# Patient Record
Sex: FEMALE | Race: WHITE | NOT HISPANIC OR LATINO | Employment: UNEMPLOYED | URBAN - METROPOLITAN AREA
[De-identification: names, ages, dates, MRNs, and addresses within clinical notes are randomized per-mention and may not be internally consistent; named-entity substitution may affect disease eponyms.]

---

## 2023-03-15 ENCOUNTER — APPOINTMENT (OUTPATIENT)
Dept: CT IMAGING | Facility: HOSPITAL | Age: 53
End: 2023-03-15
Payer: COMMERCIAL

## 2023-03-15 ENCOUNTER — HOSPITAL ENCOUNTER (EMERGENCY)
Facility: HOSPITAL | Age: 53
Discharge: HOME | End: 2023-03-15
Attending: EMERGENCY MEDICINE | Admitting: EMERGENCY MEDICINE
Payer: COMMERCIAL

## 2023-03-15 VITALS
DIASTOLIC BLOOD PRESSURE: 86 MMHG | TEMPERATURE: 96.2 F | RESPIRATION RATE: 18 BRPM | BODY MASS INDEX: 38.64 KG/M2 | HEIGHT: 62 IN | WEIGHT: 210 LBS | SYSTOLIC BLOOD PRESSURE: 157 MMHG | HEART RATE: 70 BPM | OXYGEN SATURATION: 97 %

## 2023-03-15 DIAGNOSIS — K57.92 ACUTE DIVERTICULITIS: Primary | ICD-10-CM

## 2023-03-15 DIAGNOSIS — R10.12 LEFT UPPER QUADRANT ABDOMINAL PAIN: ICD-10-CM

## 2023-03-15 DIAGNOSIS — M54.6 ACUTE LEFT-SIDED THORACIC BACK PAIN: ICD-10-CM

## 2023-03-15 LAB
ALBUMIN SERPL-MCNC: 4.3 G/DL (ref 3.5–5.2)
ALBUMIN/GLOB SERPL: 1.3 G/DL
ALP SERPL-CCNC: 82 U/L (ref 39–117)
ALT SERPL W P-5'-P-CCNC: 22 U/L (ref 1–33)
ANION GAP SERPL CALCULATED.3IONS-SCNC: 11.7 MMOL/L (ref 5–15)
AST SERPL-CCNC: 23 U/L (ref 1–32)
BASOPHILS # BLD AUTO: 0.04 10*3/MM3 (ref 0–0.2)
BASOPHILS NFR BLD AUTO: 0.6 % (ref 0–1.5)
BILIRUB SERPL-MCNC: 0.3 MG/DL (ref 0–1.2)
BILIRUB UR QL STRIP: NEGATIVE
BUN SERPL-MCNC: 8 MG/DL (ref 6–20)
BUN/CREAT SERPL: 10.4 (ref 7–25)
CALCIUM SPEC-SCNC: 9 MG/DL (ref 8.6–10.5)
CHLORIDE SERPL-SCNC: 104 MMOL/L (ref 98–107)
CLARITY UR: CLEAR
CO2 SERPL-SCNC: 27.3 MMOL/L (ref 22–29)
COLOR UR: YELLOW
CREAT SERPL-MCNC: 0.77 MG/DL (ref 0.57–1)
DEPRECATED RDW RBC AUTO: 40.6 FL (ref 37–54)
EGFRCR SERPLBLD CKD-EPI 2021: 94.1 ML/MIN/1.73
EOSINOPHIL # BLD AUTO: 0.07 10*3/MM3 (ref 0–0.4)
EOSINOPHIL NFR BLD AUTO: 1 % (ref 0.3–6.2)
ERYTHROCYTE [DISTWIDTH] IN BLOOD BY AUTOMATED COUNT: 12.4 % (ref 12.3–15.4)
GLOBULIN UR ELPH-MCNC: 3.3 GM/DL
GLUCOSE SERPL-MCNC: 99 MG/DL (ref 65–99)
GLUCOSE UR STRIP-MCNC: NEGATIVE MG/DL
HCT VFR BLD AUTO: 48.3 % (ref 34–46.6)
HGB BLD-MCNC: 15.6 G/DL (ref 12–15.9)
HGB UR QL STRIP.AUTO: NEGATIVE
IMM GRANULOCYTES # BLD AUTO: 0.02 10*3/MM3 (ref 0–0.05)
IMM GRANULOCYTES NFR BLD AUTO: 0.3 % (ref 0–0.5)
KETONES UR QL STRIP: NEGATIVE
LEUKOCYTE ESTERASE UR QL STRIP.AUTO: NEGATIVE
LIPASE SERPL-CCNC: 41 U/L (ref 13–60)
LYMPHOCYTES # BLD AUTO: 1.17 10*3/MM3 (ref 0.7–3.1)
LYMPHOCYTES NFR BLD AUTO: 17.4 % (ref 19.6–45.3)
MCH RBC QN AUTO: 28.5 PG (ref 26.6–33)
MCHC RBC AUTO-ENTMCNC: 32.3 G/DL (ref 31.5–35.7)
MCV RBC AUTO: 88.1 FL (ref 79–97)
MONOCYTES # BLD AUTO: 0.46 10*3/MM3 (ref 0.1–0.9)
MONOCYTES NFR BLD AUTO: 6.8 % (ref 5–12)
NEUTROPHILS NFR BLD AUTO: 4.96 10*3/MM3 (ref 1.7–7)
NEUTROPHILS NFR BLD AUTO: 73.9 % (ref 42.7–76)
NITRITE UR QL STRIP: NEGATIVE
NRBC BLD AUTO-RTO: 0 /100 WBC (ref 0–0.2)
PH UR STRIP.AUTO: 5.5 [PH] (ref 5–8)
PLATELET # BLD AUTO: 290 10*3/MM3 (ref 140–450)
PMV BLD AUTO: 9.4 FL (ref 6–12)
POTASSIUM SERPL-SCNC: 4.4 MMOL/L (ref 3.5–5.2)
PROT SERPL-MCNC: 7.6 G/DL (ref 6–8.5)
PROT UR QL STRIP: NEGATIVE
RBC # BLD AUTO: 5.48 10*6/MM3 (ref 3.77–5.28)
SODIUM SERPL-SCNC: 143 MMOL/L (ref 136–145)
SP GR UR STRIP: 1.01 (ref 1–1.03)
UROBILINOGEN UR QL STRIP: NORMAL
WBC NRBC COR # BLD: 6.72 10*3/MM3 (ref 3.4–10.8)

## 2023-03-15 PROCEDURE — 80053 COMPREHEN METABOLIC PANEL: CPT | Performed by: EMERGENCY MEDICINE

## 2023-03-15 PROCEDURE — 25010000002 KETOROLAC TROMETHAMINE PER 15 MG: Performed by: EMERGENCY MEDICINE

## 2023-03-15 PROCEDURE — 74176 CT ABD & PELVIS W/O CONTRAST: CPT

## 2023-03-15 PROCEDURE — 85025 COMPLETE CBC W/AUTO DIFF WBC: CPT | Performed by: EMERGENCY MEDICINE

## 2023-03-15 PROCEDURE — 25010000002 ONDANSETRON PER 1 MG: Performed by: EMERGENCY MEDICINE

## 2023-03-15 PROCEDURE — 83690 ASSAY OF LIPASE: CPT | Performed by: EMERGENCY MEDICINE

## 2023-03-15 PROCEDURE — 81003 URINALYSIS AUTO W/O SCOPE: CPT | Performed by: EMERGENCY MEDICINE

## 2023-03-15 PROCEDURE — 99284 EMERGENCY DEPT VISIT MOD MDM: CPT

## 2023-03-15 PROCEDURE — 96375 TX/PRO/DX INJ NEW DRUG ADDON: CPT

## 2023-03-15 PROCEDURE — 96374 THER/PROPH/DIAG INJ IV PUSH: CPT

## 2023-03-15 RX ORDER — ONDANSETRON 2 MG/ML
4 INJECTION INTRAMUSCULAR; INTRAVENOUS ONCE
Status: COMPLETED | OUTPATIENT
Start: 2023-03-15 | End: 2023-03-15

## 2023-03-15 RX ORDER — KETOROLAC TROMETHAMINE 30 MG/ML
INJECTION, SOLUTION INTRAMUSCULAR; INTRAVENOUS
Status: DISCONTINUED
Start: 2023-03-15 | End: 2023-03-15

## 2023-03-15 RX ORDER — ONDANSETRON 2 MG/ML
INJECTION INTRAMUSCULAR; INTRAVENOUS
Status: DISCONTINUED
Start: 2023-03-15 | End: 2023-03-15

## 2023-03-15 RX ORDER — HYDROCODONE BITARTRATE AND ACETAMINOPHEN 7.5; 325 MG/1; MG/1
1 TABLET ORAL ONCE
Status: COMPLETED | OUTPATIENT
Start: 2023-03-15 | End: 2023-03-15

## 2023-03-15 RX ORDER — AMOXICILLIN AND CLAVULANATE POTASSIUM 875; 125 MG/1; MG/1
1 TABLET, FILM COATED ORAL 2 TIMES DAILY
Qty: 20 TABLET | Refills: 0 | Status: SHIPPED | OUTPATIENT
Start: 2023-03-15 | End: 2023-03-25

## 2023-03-15 RX ORDER — KETOROLAC TROMETHAMINE 30 MG/ML
30 INJECTION, SOLUTION INTRAMUSCULAR; INTRAVENOUS ONCE
Status: COMPLETED | OUTPATIENT
Start: 2023-03-15 | End: 2023-03-15

## 2023-03-15 RX ORDER — SODIUM CHLORIDE 0.9 % (FLUSH) 0.9 %
10 SYRINGE (ML) INJECTION AS NEEDED
Status: DISCONTINUED | OUTPATIENT
Start: 2023-03-15 | End: 2023-03-15 | Stop reason: HOSPADM

## 2023-03-15 RX ORDER — ACETAMINOPHEN 500 MG
1000 TABLET ORAL EVERY 8 HOURS PRN
Qty: 60 TABLET | Refills: 0 | Status: SHIPPED | OUTPATIENT
Start: 2023-03-15

## 2023-03-15 RX ORDER — HYDROCODONE BITARTRATE AND ACETAMINOPHEN 7.5; 325 MG/1; MG/1
TABLET ORAL
Status: DISCONTINUED
Start: 2023-03-15 | End: 2023-03-15

## 2023-03-15 RX ADMIN — HYDROCODONE BITARTRATE AND ACETAMINOPHEN 1 TABLET: 7.5; 325 TABLET ORAL at 11:24

## 2023-03-15 RX ADMIN — ONDANSETRON 4 MG: 2 INJECTION INTRAMUSCULAR; INTRAVENOUS at 09:36

## 2023-03-15 RX ADMIN — KETOROLAC TROMETHAMINE 30 MG: 30 INJECTION, SOLUTION INTRAMUSCULAR at 09:36

## 2023-03-15 NOTE — ED PROVIDER NOTES
EMERGENCY DEPARTMENT ENCOUNTER    Room Number:  33/33  Date seen:  3/15/2023  PCP: Provider, No Known  Historian: Patient      HPI:  Chief Complaint: Back pain, abdominal pain  A complete HPI/ROS/PMH/PSH/SH/FH are unobtainable due to: Nothing  Context: Moon Gonzalez is a 52 y.o. female who presents to the ED c/o mid back pain that radiates around to the left side of her abdomen.  She reports this pain has been present for about 3 days, getting worse.  She denies dysuria.  She denies any injury or trauma.  She has had no rash other than a chronic rash on her lower abdomen where she had her  several years ago.  Patient denies fever.  She denies chest pain or shortness of breath.            PAST MEDICAL HISTORY  Active Ambulatory Problems     Diagnosis Date Noted   • No Active Ambulatory Problems     Resolved Ambulatory Problems     Diagnosis Date Noted   • No Resolved Ambulatory Problems     Past Medical History:   Diagnosis Date   • Asthma    • COPD (chronic obstructive pulmonary disease) ([facility]ly [facility])    • Depression    • Hypertension    • RA (rheumatoid arthritis) (Formerly [facility])    • Renal disorder          PAST SURGICAL HISTORY  Past Surgical History:   Procedure Laterality Date   • BLADDER SURGERY     • CARDIAC ABLATION     •  SECTION     • CHOLECYSTECTOMY           FAMILY HISTORY  History reviewed. No pertinent family history.      SOCIAL HISTORY  Social History     Socioeconomic History   • Marital status: Single   Tobacco Use   • Smoking status: Former     Types: Cigarettes     Quit date: 2020     Years since quitting: 3.2   • Smokeless tobacco: Never   Vaping Use   • Vaping Use: Never used   Substance and Sexual Activity   • Alcohol use: Not Currently   • Drug use: Never   • Sexual activity: Defer         ALLERGIES  Ambien [zolpidem], Bactrim [sulfamethoxazole-trimethoprim], and Zoloft [sertraline hcl]        REVIEW OF SYSTEMS  Review of Systems   Review of all 14 systems is negative other than stated in the  HPI above.      PHYSICAL EXAM  ED Triage Vitals   Temp Heart Rate Resp BP SpO2   03/15/23 0823 03/15/23 0823 03/15/23 0845 03/15/23 0845 03/15/23 0823   96.2 °F (35.7 °C) 79 20 134/86 97 %      Temp src Heart Rate Source Patient Position BP Location FiO2 (%)   03/15/23 0823 03/15/23 0823 -- -- --   Tympanic Monitor          Physical Exam      GENERAL: Awake and alert, no acute distress  HENT: nares patent  EYES: no scleral icterus, EOMI  CV: regular rhythm, normal rate  RESPIRATORY: normal effort  ABDOMEN: soft, nondistended, nontender throughout  MUSCULOSKELETAL: no deformity, mild lower T-spine tenderness without step-off, no L-spine tenderness.  Mild point tenderness over the left thoracic musculature.  NEURO: alert, moves all extremities, follows commands, cranial nerves II through XII grossly intact  PSYCH:  calm, cooperative  SKIN: warm, dry, normal to inspection, no rash over the left upper back or left flank.  There is mild erythema in the pannicular fold.    Vital signs and nursing notes reviewed.          LAB RESULTS  Recent Results (from the past 24 hour(s))   Comprehensive Metabolic Panel    Collection Time: 03/15/23  8:52 AM    Specimen: Blood   Result Value Ref Range    Glucose 99 65 - 99 mg/dL    BUN 8 6 - 20 mg/dL    Creatinine 0.77 0.57 - 1.00 mg/dL    Sodium 143 136 - 145 mmol/L    Potassium 4.4 3.5 - 5.2 mmol/L    Chloride 104 98 - 107 mmol/L    CO2 27.3 22.0 - 29.0 mmol/L    Calcium 9.0 8.6 - 10.5 mg/dL    Total Protein 7.6 6.0 - 8.5 g/dL    Albumin 4.3 3.5 - 5.2 g/dL    ALT (SGPT) 22 1 - 33 U/L    AST (SGOT) 23 1 - 32 U/L    Alkaline Phosphatase 82 39 - 117 U/L    Total Bilirubin 0.3 0.0 - 1.2 mg/dL    Globulin 3.3 gm/dL    A/G Ratio 1.3 g/dL    BUN/Creatinine Ratio 10.4 7.0 - 25.0    Anion Gap 11.7 5.0 - 15.0 mmol/L    eGFR 94.1 >60.0 mL/min/1.73   Lipase    Collection Time: 03/15/23  8:52 AM    Specimen: Blood   Result Value Ref Range    Lipase 41 13 - 60 U/L   CBC Auto Differential     Collection Time: 03/15/23  8:52 AM    Specimen: Blood   Result Value Ref Range    WBC 6.72 3.40 - 10.80 10*3/mm3    RBC 5.48 (H) 3.77 - 5.28 10*6/mm3    Hemoglobin 15.6 12.0 - 15.9 g/dL    Hematocrit 48.3 (H) 34.0 - 46.6 %    MCV 88.1 79.0 - 97.0 fL    MCH 28.5 26.6 - 33.0 pg    MCHC 32.3 31.5 - 35.7 g/dL    RDW 12.4 12.3 - 15.4 %    RDW-SD 40.6 37.0 - 54.0 fl    MPV 9.4 6.0 - 12.0 fL    Platelets 290 140 - 450 10*3/mm3    Neutrophil % 73.9 42.7 - 76.0 %    Lymphocyte % 17.4 (L) 19.6 - 45.3 %    Monocyte % 6.8 5.0 - 12.0 %    Eosinophil % 1.0 0.3 - 6.2 %    Basophil % 0.6 0.0 - 1.5 %    Immature Grans % 0.3 0.0 - 0.5 %    Neutrophils, Absolute 4.96 1.70 - 7.00 10*3/mm3    Lymphocytes, Absolute 1.17 0.70 - 3.10 10*3/mm3    Monocytes, Absolute 0.46 0.10 - 0.90 10*3/mm3    Eosinophils, Absolute 0.07 0.00 - 0.40 10*3/mm3    Basophils, Absolute 0.04 0.00 - 0.20 10*3/mm3    Immature Grans, Absolute 0.02 0.00 - 0.05 10*3/mm3    nRBC 0.0 0.0 - 0.2 /100 WBC   Urinalysis With Microscopic If Indicated (No Culture) - Urine, Clean Catch    Collection Time: 03/15/23  8:53 AM    Specimen: Urine, Clean Catch   Result Value Ref Range    Color, UA Yellow Yellow, Straw    Appearance, UA Clear Clear    pH, UA 5.5 5.0 - 8.0    Specific Gravity, UA 1.007 1.005 - 1.030    Glucose, UA Negative Negative    Ketones, UA Negative Negative    Bilirubin, UA Negative Negative    Blood, UA Negative Negative    Protein, UA Negative Negative    Leuk Esterase, UA Negative Negative    Nitrite, UA Negative Negative    Urobilinogen, UA 0.2 E.U./dL 0.2 - 1.0 E.U./dL       Ordered the above labs and reviewed the results.        RADIOLOGY  CT Abdomen Pelvis Without Contrast    Result Date: 3/15/2023  CT ABDOMEN AND PELVIS WITHOUT IV CONTRAST  HISTORY: Midthoracic back pain, left flank/upper quadrant pain.  TECHNIQUE: Radiation dose reduction techniques were utilized, including automated exposure control and exposure modulation based on body size. 3 mm  images were obtained through the abdomen and pelvis without the administration of IV contrast. Lack of IV contrast limits evaluation of solid, visceral, and vascular structures. Sensitivity for underlying lesions and infection decreased.  COMPARISON: None  FINDINGS:  LOWER CHEST: Streak artifact from overlying leads/soft tissues. Within normal limits.  ABDOMEN: Liver/Biliary Tract: Heterogeneous areas of low-attenuation can be seen with fatty infiltration. Cholecystectomy.  Spleen: Within normal limits.  Pancreas: Within normal limits.  Adrenals: Within normal limits.  Kidneys:  No hydronephrosis or radiopaque obstructing stones.  Bowel:  No obstruction. Normal appendix. Scattered colonic diverticulosis with very minimal stranding subjacent to the mid descending colon and mildly prominent subjacent vasculature. Ensure up-to-date with colonoscopy after treatment.  Peritoneum: Within normal limits.  Vasculature:    Scattered calcific atherosclerosis.  Lymph Nodes:  Scattered subcentimeter nodes not enlarged by CT criteria.                             PELVIS:                                 Pelvic organs: The bladder is incompletely distended. Fluid and/or thickening in the endocervical canal. Phleboliths/vascular calcifications.  Abdominal/Pelvic Wall: Scarring ventral pelvis. Mild subcutaneous soft tissue edema..  BONES: Multilevel degenerative changes most severe at L5-S1 at least moderate canal stenosis and neuroforaminal narrowing.      1. Colonic diverticulosis with subtle stranding about the proximal descending which can be seen with developing or evolving uncomplicated diverticulitis in the appropriate setting. 2. Degenerative changes most severe at L5-S1. Given back pain, MRI could be considered for further evaluation. 3. Fluid and/or thickening about the endocervical canal please correlate with phase of menstrual cycle and ensure up-to-date with pelvic exam. 4. Please see above for additional  findings/recommendations.  This report was finalized on 3/15/2023 9:35 AM by Dr. Kevin Traore M.D.        Ordered the above noted radiological studies. Reviewed by me in PACS.            PROCEDURES  Procedures              MEDICATIONS GIVEN IN ER  Medications   ketorolac (TORADOL) injection 30 mg (30 mg Intravenous Given 3/15/23 0936)   ondansetron (ZOFRAN) injection 4 mg (4 mg Intravenous Given 3/15/23 0936)   HYDROcodone-acetaminophen (NORCO) 7.5-325 MG per tablet 1 tablet (1 tablet Oral Given 3/15/23 1124)                   MEDICAL DECISION MAKING, PROGRESS, and CONSULTS    All labs have been independently reviewed by me.  All radiology studies have been reviewed by me and I have also reviewed the radiology report.   EKG's independently viewed and interpreted by me.  Discussion below represents my analysis of pertinent findings related to patient's condition, differential diagnosis, treatment plan and final disposition.      Additional sources:  - Discussed/ obtained information from independent historians:  n/a      - Chronic or social conditions impacting care: patient currently uninsured        Orders placed during this visit:  Orders Placed This Encounter   Procedures   • CT Abdomen Pelvis Without Contrast   • Comprehensive Metabolic Panel   • Lipase   • Urinalysis With Microscopic If Indicated (No Culture) - Urine, Clean Catch   • CBC Auto Differential   • Ambulatory Referral to Family Practice   • CBC & Differential                 Differential diagnosis:    Thoracic muscle strain  Pancreatitis  Aortic pathology  Colitis  Ureteral calculus      Independent interpretation of labs, radiology studies, and discussions with consultants:  ED Course as of 03/15/23 2114   Wed Mar 15, 2023   0931 Blood, UA: Negative [JR]   0932 WBC: 6.72 [JR]   0932 CT abdomen independently interpreted in PACS.  There is no ureteral calculus or evidence of perinephric fat stranding. [JR]   1034 Patient informed of CT findings  concerning for diverticulitis.  On repeat examination she does have some point tenderness in the left upper quadrant of the abdomen correlating with the findings on CT.  I discussed plan to treat her with empiric oral antibiotics for uncomplicated diverticulitis and I discussed return precautions.  She will be referred to PCP [JR]      ED Course User Index  [JR] Blair Tinoco MD                 DIAGNOSIS  Final diagnoses:   Acute diverticulitis   Acute left-sided thoracic back pain   Left upper quadrant abdominal pain         DISPOSITION  DISCHARGE    Patient discharged in stable condition.    Reviewed implications of results, diagnosis, meds, responsibility to follow up, warning signs and symptoms of possible worsening, potential complications and reasons to return to ER.    Patient/Family voiced understanding of above instructions.    Discussed plan for discharge, as there is no emergent indication for admission. Patient referred to primary care provider for BP management due to today's BP. Pt/family is agreeable and understands need for follow up and repeat testing.  Pt is aware that discharge does not mean that nothing is wrong but it indicates no emergency is present that requires admission and they must continue care with follow-up as given below or physician of their choice.     FOLLOW-UP  PATIENT CONNECTION - [place] [id number]  [phone]  Schedule an appointment as soon as possible for a visit            Medication List      New Prescriptions    Acetaminophen Extra Strength 500 MG tablet  Generic drug: acetaminophen  Take 2 tablets by mouth Every 8 (Eight) Hours As Needed for Mild Pain.     amoxicillin-clavulanate 875-125 MG per tablet  Commonly known as: AUGMENTIN  Take 1 tablet by mouth 2 (Two) Times a Day for 10 days.           Where to Get Your Medications      These medications were sent to [facility] - [place] [address]     Hours: 7:00 AM-6:00 PM Mon-Fri, 8:00 AM-4:30 PM Sat-Sun (Closed 12-12:30PM) Phone: [phone]   · Acetaminophen Extra Strength 500 MG tablet  · amoxicillin-clavulanate 875-125 MG per tablet                   Latest Documented Vital Signs:  As of 21:14 EDT  BP- 157/86 HR- 70 Temp- 96.2 °F (35.7 °C) (Tympanic) O2 sat- 97%              --    Please note that portions of this were completed with a voice recognition program.       Note Disclaimer: At [facility], we believe that sharing information builds trust and better relationships. You are receiving this note because you are receiving care at [facility] or recently visited. It is possible you will see health information before a provider has talked with you about it. This kind of information can be easy to misunderstand. To help you fully understand what it means for your health, we urge you to discuss this note with your provider.           Blair Tinoco MD  03/15/23 9581

## 2023-03-15 NOTE — ED TRIAGE NOTES
"Pt to ED from home. Pt complains of mid lower back pain x3 days. Pt states pain wraps around to llq. Pt states pain is constant but increases with movement. Pt ambulatory. Pt reports she has been seen previously for this pain but was told \"nothing was wrong.\"   "

## 2024-10-22 ENCOUNTER — TRANSCRIBE ORDERS (OUTPATIENT)
Facility: HOSPITAL | Age: 54
End: 2024-10-22

## 2024-10-22 DIAGNOSIS — Z12.31 ENCOUNTER FOR SCREENING MAMMOGRAM FOR MALIGNANT NEOPLASM OF BREAST: Primary | ICD-10-CM

## 2024-10-31 ENCOUNTER — HOSPITAL ENCOUNTER (OUTPATIENT)
Facility: HOSPITAL | Age: 54
Discharge: HOME | End: 2024-11-03
Payer: COMMERCIAL

## 2024-10-31 VITALS — HEIGHT: 62 IN | WEIGHT: 196 LBS | BODY MASS INDEX: 36.07 KG/M2

## 2024-10-31 DIAGNOSIS — Z12.31 ENCOUNTER FOR SCREENING MAMMOGRAM FOR MALIGNANT NEOPLASM OF BREAST: ICD-10-CM

## 2024-10-31 PROCEDURE — 77067 SCR MAMMO BI INCL CAD: CPT

## 2024-10-31 PROCEDURE — 77063 BREAST TOMOSYNTHESIS BI: CPT

## 2024-11-12 ENCOUNTER — HOSPITAL ENCOUNTER (EMERGENCY)
Facility: HOSPITAL | Age: 54
Discharge: HOME | End: 2024-11-12
Payer: COMMERCIAL

## 2024-11-12 VITALS
TEMPERATURE: 98.1 F | HEIGHT: 62 IN | OXYGEN SATURATION: 97 % | SYSTOLIC BLOOD PRESSURE: 102 MMHG | RESPIRATION RATE: 18 BRPM | WEIGHT: 205 LBS | HEART RATE: 67 BPM | BODY MASS INDEX: 37.73 KG/M2 | DIASTOLIC BLOOD PRESSURE: 71 MMHG

## 2024-11-12 DIAGNOSIS — Z20.2 EXPOSURE TO STD: ICD-10-CM

## 2024-11-12 DIAGNOSIS — A59.00 GU INFECTION, TRICHOMONAL: Primary | ICD-10-CM

## 2024-11-12 DIAGNOSIS — B96.89 BACTERIAL VAGINOSIS: ICD-10-CM

## 2024-11-12 DIAGNOSIS — N76.0 BACTERIAL VAGINOSIS: ICD-10-CM

## 2024-11-12 LAB
APPEARANCE UR: ABNORMAL
BACTERIA URNS QL MICRO: NEGATIVE /HPF
BILIRUB UR QL: NEGATIVE
CLUE CELLS VAG QL WET PREP: ABNORMAL
COLOR UR: ABNORMAL
EPITH CASTS URNS QL MICRO: ABNORMAL /LPF
GLUCOSE UR STRIP.AUTO-MCNC: NEGATIVE MG/DL
HGB UR QL STRIP: ABNORMAL
KETONES UR QL STRIP.AUTO: NEGATIVE MG/DL
LEUKOCYTE ESTERASE UR QL STRIP.AUTO: ABNORMAL
NITRITE UR QL STRIP.AUTO: NEGATIVE
PH UR STRIP: 6 (ref 5–8)
PROT UR STRIP-MCNC: NEGATIVE MG/DL
RBC #/AREA URNS HPF: ABNORMAL /HPF (ref 0–5)
SP GR UR REFRACTOMETRY: 1.01 (ref 1–1.03)
T VAGINALIS VAG QL WET PREP: ABNORMAL
TRICHOMONAS UR QL MICRO: PRESENT
URINE CULTURE IF INDICATED: ABNORMAL
UROBILINOGEN UR QL STRIP.AUTO: 0.1 EU/DL (ref 0.2–1)
WBC URNS QL MICRO: ABNORMAL /HPF (ref 0–4)
YEAST: ABNORMAL

## 2024-11-12 PROCEDURE — 87591 N.GONORRHOEAE DNA AMP PROB: CPT

## 2024-11-12 PROCEDURE — 87086 URINE CULTURE/COLONY COUNT: CPT

## 2024-11-12 PROCEDURE — 96372 THER/PROPH/DIAG INJ SC/IM: CPT

## 2024-11-12 PROCEDURE — 2500000003 HC RX 250 WO HCPCS: Performed by: PHYSICIAN ASSISTANT

## 2024-11-12 PROCEDURE — 6360000002 HC RX W HCPCS: Performed by: PHYSICIAN ASSISTANT

## 2024-11-12 PROCEDURE — 87491 CHLMYD TRACH DNA AMP PROBE: CPT

## 2024-11-12 PROCEDURE — 6370000000 HC RX 637 (ALT 250 FOR IP): Performed by: PHYSICIAN ASSISTANT

## 2024-11-12 PROCEDURE — 87210 SMEAR WET MOUNT SALINE/INK: CPT

## 2024-11-12 PROCEDURE — 99284 EMERGENCY DEPT VISIT MOD MDM: CPT

## 2024-11-12 PROCEDURE — 81001 URINALYSIS AUTO W/SCOPE: CPT

## 2024-11-12 RX ORDER — METRONIDAZOLE 500 MG/1
500 TABLET ORAL 3 TIMES DAILY
Qty: 30 TABLET | Refills: 0 | Status: SHIPPED | OUTPATIENT
Start: 2024-11-12 | End: 2024-11-22

## 2024-11-12 RX ORDER — ONDANSETRON 4 MG/1
4 TABLET, ORALLY DISINTEGRATING ORAL EVERY 8 HOURS PRN
Status: DISCONTINUED | OUTPATIENT
Start: 2024-11-12 | End: 2024-11-12 | Stop reason: HOSPADM

## 2024-11-12 RX ORDER — AZITHROMYCIN 500 MG/1
1000 TABLET, FILM COATED ORAL
Status: COMPLETED | OUTPATIENT
Start: 2024-11-12 | End: 2024-11-12

## 2024-11-12 RX ADMIN — ONDANSETRON 4 MG: 4 TABLET, ORALLY DISINTEGRATING ORAL at 15:17

## 2024-11-12 RX ADMIN — AZITHROMYCIN DIHYDRATE 1000 MG: 500 TABLET ORAL at 15:17

## 2024-11-12 RX ADMIN — LIDOCAINE HYDROCHLORIDE 500 MG: 10 INJECTION, SOLUTION EPIDURAL; INFILTRATION; INTRACAUDAL; PERINEURAL at 15:18

## 2024-11-12 ASSESSMENT — LIFESTYLE VARIABLES
HOW OFTEN DO YOU HAVE A DRINK CONTAINING ALCOHOL: NEVER
HOW MANY STANDARD DRINKS CONTAINING ALCOHOL DO YOU HAVE ON A TYPICAL DAY: PATIENT DOES NOT DRINK

## 2024-11-12 ASSESSMENT — PAIN SCALES - GENERAL: PAINLEVEL_OUTOF10: 6

## 2024-11-12 ASSESSMENT — PAIN DESCRIPTION - LOCATION: LOCATION: ABDOMEN

## 2024-11-12 ASSESSMENT — PAIN - FUNCTIONAL ASSESSMENT: PAIN_FUNCTIONAL_ASSESSMENT: 0-10

## 2024-11-12 NOTE — ED TRIAGE NOTES
Pt ambulatory to triage reporting 6/10 lower abdominal pain, swelling and pain to genital area, and gray/green vaginal discharge. Tried treatment with monistat and vagisil with no improvement. Unsure if STD or yeast infection.

## 2024-11-12 NOTE — ED PROVIDER NOTES
Reflex to Culture    Collection Time: 11/12/24  2:30 PM    Specimen: Urine   Result Value Ref Range    Color, UA Yellow/Straw      Appearance Hazy (A) Clear      Specific Gravity, UA 1.015 1.003 - 1.030      pH, Urine 6.0 5.0 - 8.0      Protein, UA Negative Negative mg/dL    Glucose, Ur Negative Negative mg/dL    Ketones, Urine Negative Negative mg/dL    Bilirubin, Urine Negative Negative      Blood, Urine Small (A) Negative      Urobilinogen, Urine 0.1 (L) 0.2 - 1.0 EU/dL    Nitrite, Urine Negative Negative      Leukocyte Esterase, Urine Large (A) Negative      WBC, UA 10-20 0 - 4 /hpf    RBC, UA 0-5 0 - 5 /hpf    Epithelial Cells, UA Few Few /lpf    BACTERIA, URINE Negative Negative /hpf    Urine Culture if Indicated Urine Culture Ordered (A) Culture not indicated by UA result      Trichomonas, Urine Present     Wet prep, genital    Collection Time: 11/12/24  2:30 PM    Specimen: Miscellaneous sample   Result Value Ref Range    Clue Cells, Wet Prep Clue Cells present (A) NONE SEEN      Trich, Wet Prep Trichomonas present (A) NONE SEEN      Yeast, UA NONE SEEN NONE SEEN         EKG: Not Applicable    Radiologic Studies:  Non-plain film images such as CT, Ultrasound and MRI are read by the radiologist. Plain radiographic images are visualized and preliminarily interpreted by the ED Physician with the following findings: Not Applicable.    Interpretation per the Radiologist below, if available at the time of this note:  No orders to display        ED COURSE and DIFFERENTIAL DIAGNOSIS/MDM   3:10 PM Differential and Considerations: 54-year-old female presents to the ED with vaginal irritation lower abdominal pain.  I have ordered swabs for GC chlamydia, wet prep and will check a urine.  I have deferred her  exam, until I get the results of her swabs back.  Should the swabs be inconclusive then I will conduct a GYN exam.    Records Reviewed (source and summary of external notes): Prior medical records and Nursing

## 2024-11-12 NOTE — DISCHARGE INSTRUCTIONS
Thank you for choosing our Emergency Department for your care.  It is our privilege to care for you in your time of need.  In the next several days, you may receive a survey via email or mailed to your home about your experience with our team.  We would greatly appreciate you taking a few minutes to complete the survey, as we use this information to learn what we have done well and what we could be doing better. Thank you for trusting us with your care!    Below you will find a list of your tests from today's visit.   Labs  Recent Results (from the past 12 hour(s))   Urinalysis with Reflex to Culture    Collection Time: 11/12/24  2:30 PM    Specimen: Urine   Result Value Ref Range    Color, UA Yellow/Straw      Appearance Hazy (A) Clear      Specific Gravity, UA 1.015 1.003 - 1.030      pH, Urine 6.0 5.0 - 8.0      Protein, UA Negative Negative mg/dL    Glucose, Ur Negative Negative mg/dL    Ketones, Urine Negative Negative mg/dL    Bilirubin, Urine Negative Negative      Blood, Urine Small (A) Negative      Urobilinogen, Urine 0.1 (L) 0.2 - 1.0 EU/dL    Nitrite, Urine Negative Negative      Leukocyte Esterase, Urine Large (A) Negative      WBC, UA 10-20 0 - 4 /hpf    RBC, UA 0-5 0 - 5 /hpf    Epithelial Cells, UA Few Few /lpf    BACTERIA, URINE Negative Negative /hpf    Urine Culture if Indicated Urine Culture Ordered (A) Culture not indicated by UA result      Trichomonas, Urine Present     Wet prep, genital    Collection Time: 11/12/24  2:30 PM    Specimen: Miscellaneous sample   Result Value Ref Range    Clue Cells, Wet Prep Clue Cells present (A) NONE SEEN      Trich, Wet Prep Trichomonas present (A) NONE SEEN      Yeast, UA NONE SEEN NONE SEEN         Radiologic Studies  No orders to display     ------------------------------------------------------------------------------------------------------------  The evaluation and treatment you received in the Emergency Department were for an urgent problem. It is

## 2024-11-13 LAB
BACTERIA SPEC CULT: NORMAL
C TRACH DNA SPEC QL NAA+PROBE: NEGATIVE
COLONY COUNT, CNT: NORMAL
COLONY COUNT, CNT: NORMAL
Lab: NORMAL
N GONORRHOEA DNA SPEC QL NAA+PROBE: NEGATIVE
SAMPLE TYPE: NORMAL
SERVICE CMNT-IMP: NORMAL
SPECIMEN SOURCE: NORMAL

## 2024-11-14 ENCOUNTER — TELEPHONE (OUTPATIENT)
Age: 54
End: 2024-11-14

## 2024-11-14 NOTE — TELEPHONE ENCOUNTER
11/14/2024 @7:31am-Called [phone] and L/M to have patient R/T phone call due to message she left to R/S appt 11/14/2024 due to transportation.ww

## 2025-05-05 ENCOUNTER — HOSPITAL ENCOUNTER (INPATIENT)
Facility: HOSPITAL | Age: 55
LOS: 4 days | Discharge: OTHER INSTITUTION | DRG: 750 | End: 2025-05-09
Attending: STUDENT IN AN ORGANIZED HEALTH CARE EDUCATION/TRAINING PROGRAM | Admitting: PSYCHIATRY & NEUROLOGY
Payer: COMMERCIAL

## 2025-05-05 DIAGNOSIS — R45.851 SUICIDAL IDEATION: ICD-10-CM

## 2025-05-05 PROBLEM — F20.9 SCHIZOPHRENIA (HCC): Status: ACTIVE | Noted: 2025-05-05

## 2025-05-05 LAB
ALBUMIN SERPL-MCNC: 2.7 G/DL (ref 3.5–5)
ALBUMIN/GLOB SERPL: 0.9 (ref 1.1–2.2)
ALP SERPL-CCNC: 69 U/L (ref 45–117)
ALT SERPL-CCNC: 23 U/L (ref 12–78)
AMPHET UR QL SCN: POSITIVE
ANION GAP SERPL CALC-SCNC: 2 MMOL/L (ref 2–12)
APPEARANCE UR: CLEAR
AST SERPL W P-5'-P-CCNC: 21 U/L (ref 15–37)
BACTERIA URNS QL MICRO: NEGATIVE /HPF
BARBITURATES UR QL SCN: NEGATIVE
BASOPHILS # BLD: 0.05 K/UL (ref 0–0.1)
BASOPHILS NFR BLD: 0.8 % (ref 0–1)
BENZODIAZ UR QL: NEGATIVE
BILIRUB SERPL-MCNC: 0.2 MG/DL (ref 0.2–1)
BILIRUB UR QL: NEGATIVE
BUN SERPL-MCNC: 6 MG/DL (ref 6–20)
BUN/CREAT SERPL: 7 (ref 12–20)
CA-I BLD-MCNC: 8.6 MG/DL (ref 8.5–10.1)
CANNABINOIDS UR QL SCN: NEGATIVE
CHLORIDE SERPL-SCNC: 113 MMOL/L (ref 97–108)
CO2 SERPL-SCNC: 29 MMOL/L (ref 21–32)
COCAINE UR QL SCN: POSITIVE
COLOR UR: ABNORMAL
CREAT SERPL-MCNC: 0.85 MG/DL (ref 0.55–1.02)
DIFFERENTIAL METHOD BLD: NORMAL
EOSINOPHIL # BLD: 0.15 K/UL (ref 0–0.4)
EOSINOPHIL NFR BLD: 2.5 % (ref 0–7)
EPITH CASTS URNS QL MICRO: ABNORMAL /LPF
ERYTHROCYTE [DISTWIDTH] IN BLOOD BY AUTOMATED COUNT: 14.3 % (ref 11.5–14.5)
ETHANOL SERPL-MCNC: <10 MG/DL (ref 0–0.08)
FLUAV RNA SPEC QL NAA+PROBE: NOT DETECTED
FLUBV RNA SPEC QL NAA+PROBE: NOT DETECTED
GLOBULIN SER CALC-MCNC: 3 G/DL (ref 2–4)
GLUCOSE SERPL-MCNC: 96 MG/DL (ref 65–100)
GLUCOSE UR STRIP.AUTO-MCNC: 150 MG/DL
HCT VFR BLD AUTO: 43.2 % (ref 35–47)
HGB BLD-MCNC: 13.9 G/DL (ref 11.5–16)
HGB UR QL STRIP: NEGATIVE
IMM GRANULOCYTES # BLD AUTO: 0.03 K/UL (ref 0–0.04)
IMM GRANULOCYTES NFR BLD AUTO: 0.5 % (ref 0–0.5)
KETONES UR QL STRIP.AUTO: NEGATIVE MG/DL
LEUKOCYTE ESTERASE UR QL STRIP.AUTO: NEGATIVE
LYMPHOCYTES # BLD: 1.44 K/UL (ref 0.8–3.5)
LYMPHOCYTES NFR BLD: 23.7 % (ref 12–49)
Lab: ABNORMAL
MCH RBC QN AUTO: 28.7 PG (ref 26–34)
MCHC RBC AUTO-ENTMCNC: 32.2 G/DL (ref 30–36.5)
MCV RBC AUTO: 89.3 FL (ref 80–99)
METHADONE UR QL: NEGATIVE
MONOCYTES # BLD: 0.35 K/UL (ref 0–1)
MONOCYTES NFR BLD: 5.8 % (ref 5–13)
MUCOUS THREADS URNS QL MICRO: ABNORMAL /LPF
NEUTS SEG # BLD: 4.05 K/UL (ref 1.8–8)
NEUTS SEG NFR BLD: 66.7 % (ref 32–75)
NITRITE UR QL STRIP.AUTO: NEGATIVE
NRBC # BLD: 0 K/UL (ref 0–0.01)
NRBC BLD-RTO: 0 PER 100 WBC
OPIATES UR QL: NEGATIVE
PCP UR QL: NEGATIVE
PH UR STRIP: 5 (ref 5–8)
PLATELET # BLD AUTO: 277 K/UL (ref 150–400)
PMV BLD AUTO: 9.9 FL (ref 8.9–12.9)
POTASSIUM SERPL-SCNC: 4.4 MMOL/L (ref 3.5–5.1)
PROT SERPL-MCNC: 5.7 G/DL (ref 6.4–8.2)
PROT UR STRIP-MCNC: NEGATIVE MG/DL
RBC # BLD AUTO: 4.84 M/UL (ref 3.8–5.2)
RBC #/AREA URNS HPF: ABNORMAL /HPF (ref 0–5)
SARS-COV-2 RNA RESP QL NAA+PROBE: NOT DETECTED
SODIUM SERPL-SCNC: 144 MMOL/L (ref 136–145)
SP GR UR REFRACTOMETRY: 1.01 (ref 1–1.03)
URINE CULTURE IF INDICATED: ABNORMAL
UROBILINOGEN UR QL STRIP.AUTO: 0.1 EU/DL (ref 0.1–1)
WBC # BLD AUTO: 6.1 K/UL (ref 3.6–11)
WBC URNS QL MICRO: ABNORMAL /HPF (ref 0–4)

## 2025-05-05 PROCEDURE — 99285 EMERGENCY DEPT VISIT HI MDM: CPT

## 2025-05-05 PROCEDURE — 80307 DRUG TEST PRSMV CHEM ANLYZR: CPT

## 2025-05-05 PROCEDURE — 1240000000 HC EMOTIONAL WELLNESS R&B

## 2025-05-05 PROCEDURE — 80053 COMPREHEN METABOLIC PANEL: CPT

## 2025-05-05 PROCEDURE — 87636 SARSCOV2 & INF A&B AMP PRB: CPT

## 2025-05-05 PROCEDURE — 82077 ASSAY SPEC XCP UR&BREATH IA: CPT

## 2025-05-05 PROCEDURE — 81001 URINALYSIS AUTO W/SCOPE: CPT

## 2025-05-05 PROCEDURE — 93005 ELECTROCARDIOGRAM TRACING: CPT | Performed by: EMERGENCY MEDICINE

## 2025-05-05 PROCEDURE — 6370000000 HC RX 637 (ALT 250 FOR IP): Performed by: EMERGENCY MEDICINE

## 2025-05-05 PROCEDURE — 90791 PSYCH DIAGNOSTIC EVALUATION: CPT

## 2025-05-05 PROCEDURE — 85025 COMPLETE CBC W/AUTO DIFF WBC: CPT

## 2025-05-05 RX ORDER — LORAZEPAM 1 MG/1
1 TABLET ORAL ONCE
Status: COMPLETED | OUTPATIENT
Start: 2025-05-05 | End: 2025-05-05

## 2025-05-05 RX ORDER — TRAZODONE HYDROCHLORIDE 50 MG/1
50 TABLET ORAL NIGHTLY PRN
Status: DISCONTINUED | OUTPATIENT
Start: 2025-05-05 | End: 2025-05-07

## 2025-05-05 RX ORDER — MAGNESIUM HYDROXIDE/ALUMINUM HYDROXICE/SIMETHICONE 120; 1200; 1200 MG/30ML; MG/30ML; MG/30ML
30 SUSPENSION ORAL EVERY 6 HOURS PRN
Status: DISCONTINUED | OUTPATIENT
Start: 2025-05-05 | End: 2025-05-09 | Stop reason: HOSPADM

## 2025-05-05 RX ORDER — ACETAMINOPHEN 325 MG/1
650 TABLET ORAL EVERY 4 HOURS PRN
Status: DISCONTINUED | OUTPATIENT
Start: 2025-05-05 | End: 2025-05-09 | Stop reason: HOSPADM

## 2025-05-05 RX ADMIN — LORAZEPAM 1 MG: 1 TABLET ORAL at 21:54

## 2025-05-05 ASSESSMENT — PAIN SCALES - GENERAL
PAINLEVEL_OUTOF10: 8

## 2025-05-05 ASSESSMENT — PAIN - FUNCTIONAL ASSESSMENT: PAIN_FUNCTIONAL_ASSESSMENT: 0-10

## 2025-05-05 ASSESSMENT — PAIN DESCRIPTION - LOCATION: LOCATION: GENERALIZED

## 2025-05-05 NOTE — VIRTUAL HEALTH
Mariana Benavides  [id number]  1970     Social Work Behavioral Health Crisis Assessment    05/05/25    Chief Complaint: Suicidal ideation    HPI: Patient is a 54 y.o. White (non-) female who presents for suicidal ideation. Patient presented to the ED on 05/05/25 from [facility]. She is alert, oriented, cooperative, attentive, and tearful. Three days ago, she overdosed on cocaine with the intention of ending her life. She has a history of suicide attempts, to include: intentional overdoses, hanging, and cutting her wrist. She has a psychiatric history of: Schizophrenia, Paranoia, Command Auditory Hallucinations, Anxiety, Depression, Cocaine Abuse, Insomnia and PTSD. She's had multiple inpatient psychiatric admissions in the past. [facility] and participates in outpatient psychiatric care with SAYRA Malik APRN. She takes her psychiatric medication as prescribed but feels the dosages my be too strong. She has a poor support system. Her only identified reason for living is God.    Collateral: Unable to obtain collateral    Past Psychiatric History:  Previous Diagnoses/symptoms: Schizophrenia, Paranoia, Command Auditory Hallucinations, Anxiety, Depression, Cocaine Abuse, Insomnia, PTSD  Previous suicide attempts/self-harm: intentionally overdosed 3 nights , intentional overdose multiple times in the past, hanging, slit wrist  Inpatient psychiatric hospitalizations: yes  Current outpatient psychiatric provider: SAYRA Malik APRN  Current therapist: Tameka Vang  Previous psychiatric medication trials: No prior medication trials  Current psychiatric medications: Clonazapam, Topamax   Family Psychiatric History: anxiety, depression    Sleep Hours: up to 16 hours per day  Sleep concerns:  sleeps too much  Use of sleep medications:  Seroquel    Substance Abuse History:  Tobacco: Endorses daily  Alcohol: Denies  Marijuana: Denies  Stimulant:

## 2025-05-05 NOTE — ED NOTES
Pt changed into green gown, belongings placed in (2) plastic pt belongings bags that are labeled; pt also has (1) tote bag with her with multiple pt labels present on the inside/outside. 3 bags placed in nourishment room at station 3.     Pt updated on plan of care; is calm and cooperative.

## 2025-05-05 NOTE — ED TRIAGE NOTES
Patient states a few days ago used crack cocaine in an attempt to overdose. She is endorsing SI thoughts. She is in recovery program.   Last use of drugs Friday.     Paranoid schiz and on meds, + complaint.     She is with an outreach program who brought her here.     [facility].     No hi, endorses hearing voices to harm herself.     Patient is tearful, states she has lost all her family both kids and this time of year is tough.     Placed in triage chair 1 with 1:1 constant observer after triage as no rooms are available.

## 2025-05-05 NOTE — ED PROVIDER NOTES
EMERGENCY DEPARTMENT HISTORY AND PHYSICAL EXAM      Patient Name: Mariana Benavides  MRN: [id number]  YOB: 1970  Date of evaluation: 5/5/2025  Provider: Robi Diggs MD     History of Present Illness     Chief Complaint   Patient presents with    Mental Health Problem    Suicidal       History Provided By: Patient    HPI: Mariana Benavides, 54 y.o. female with past medical history as listed and reviewed below presenting to the ED for suicidal ideation, she states she tried to overdose on cocaine a few days ago, currently in drug rehab program.  Sent here from her reach program.  She states history of paranoid schizophrenia with positive already hallucinations encouraging suicide.  She denies any other symptoms.    Medical History     Past Medical History:  No past medical history on file.    Past Surgical History:  No past surgical history on file.    Family History:  Family History   Problem Relation Age of Onset    Breast Cancer Maternal Grandmother        Social History:  Social History     Tobacco Use    Smoking status: Every Day     Types: Cigarettes    Smokeless tobacco: Never   Vaping Use    Vaping status: Never Used   Substance Use Topics    Alcohol use: Not Currently    Drug use: Yes     Types: Cocaine       Allergies:  Allergies   Allergen Reactions    Ambien [Zolpidem Tartrate]     Zoloft [Sertraline Hcl] Hallucinations    Bactrim [Sulfamethoxazole-Trimethoprim] Rash       PCP: Ruddy Mitchell MD    Current Medications:   Current Facility-Administered Medications   Medication Dose Route Frequency Provider Last Rate Last Admin    acetaminophen (TYLENOL) tablet 650 mg  650 mg Oral Q4H PRN Ray Posadas MD        traZODone (DESYREL) tablet 50 mg  50 mg Oral Nightly PRN Ray Posadas MD        magnesium hydroxide (MILK OF MAGNESIA) 400 MG/5ML suspension 30 mL  30 mL Oral Daily PRN Ray Posadas MD        aluminum & magnesium hydroxide-simethicone (MAALOX PLUS) 200-200-20 MG/5ML        Physical Exam  Vitals and nursing note reviewed.   HENT:      Nose: Nose normal.      Mouth/Throat:      Mouth: Mucous membranes are moist.   Cardiovascular:      Pulses: Normal pulses.   Pulmonary:      Effort: Pulmonary effort is normal.      Breath sounds: Normal breath sounds.   Abdominal:      Palpations: Abdomen is soft.   Skin:     General: Skin is warm.      Coloration: Skin is not pale.   Neurological:      General: No focal deficit present.      Mental Status: She is alert.   Psychiatric:      Comments: Tearful and suicidal            Medical Decision Making     Records Reviewed: Prior medical records and nursing Notes    1:24 PM Differential and Considerations of tests not ordered: Patient is a 54 y.o. female presenting for psychiatric evaluation. Vitals reveal no significant abnormalities and physical exam reveals no significant abnormalities. Based on the history, physical exam, risk factors, and vitals signs, I favor the following differential diagnoses: bipolar disorder, psychosis, schizophrenia, medication noncompliance, depression, substance abuse, suicidal ideation, homicidal ideation, acute stress reaction, personality disorder.    Will consult psychiatry. See ED Course and Reassessment for discussion and interpretations.       Screenings:                         Clinical Management Tools:   Not Applicable    ED Course     Patient was given the following medications:  Medications   acetaminophen (TYLENOL) tablet 650 mg (has no administration in time range)   traZODone (DESYREL) tablet 50 mg (has no administration in time range)   magnesium hydroxide (MILK OF MAGNESIA) 400 MG/5ML suspension 30 mL (has no administration in time range)   aluminum & magnesium hydroxide-simethicone (MAALOX PLUS) 200-200-20 MG/5ML suspension 30 mL (has no administration in time range)   LORazepam (ATIVAN) tablet 1 mg (1 mg Oral Given 5/5/25 2154)       ED Course and Reassessments:    1650: Psych recommends

## 2025-05-06 LAB
EKG ATRIAL RATE: 59 BPM
EKG DIAGNOSIS: NORMAL
EKG P AXIS: 35 DEGREES
EKG P-R INTERVAL: 118 MS
EKG Q-T INTERVAL: 446 MS
EKG QRS DURATION: 80 MS
EKG QTC CALCULATION (BAZETT): 441 MS
EKG R AXIS: 7 DEGREES
EKG T AXIS: 8 DEGREES
EKG VENTRICULAR RATE: 59 BPM

## 2025-05-06 PROCEDURE — 1240000000 HC EMOTIONAL WELLNESS R&B

## 2025-05-06 PROCEDURE — 6370000000 HC RX 637 (ALT 250 FOR IP): Performed by: PSYCHIATRY & NEUROLOGY

## 2025-05-06 PROCEDURE — 6370000000 HC RX 637 (ALT 250 FOR IP): Performed by: STUDENT IN AN ORGANIZED HEALTH CARE EDUCATION/TRAINING PROGRAM

## 2025-05-06 RX ORDER — SUMATRIPTAN SUCCINATE 25 MG/1
50 TABLET ORAL ONCE
Status: DISCONTINUED | OUTPATIENT
Start: 2025-05-06 | End: 2025-05-06

## 2025-05-06 RX ORDER — SUMATRIPTAN SUCCINATE 25 MG/1
50 TABLET ORAL DAILY PRN
Status: DISCONTINUED | OUTPATIENT
Start: 2025-05-06 | End: 2025-05-09 | Stop reason: HOSPADM

## 2025-05-06 RX ORDER — ALBUTEROL SULFATE 90 UG/1
2 INHALANT RESPIRATORY (INHALATION) EVERY 6 HOURS PRN
Status: DISCONTINUED | OUTPATIENT
Start: 2025-05-06 | End: 2025-05-09 | Stop reason: HOSPADM

## 2025-05-06 RX ORDER — AMLODIPINE BESYLATE 5 MG/1
5 TABLET ORAL NIGHTLY
Status: DISCONTINUED | OUTPATIENT
Start: 2025-05-06 | End: 2025-05-09 | Stop reason: HOSPADM

## 2025-05-06 RX ORDER — ATORVASTATIN CALCIUM 20 MG/1
20 TABLET, FILM COATED ORAL DAILY
Status: DISCONTINUED | OUTPATIENT
Start: 2025-05-06 | End: 2025-05-06

## 2025-05-06 RX ORDER — LOPERAMIDE HYDROCHLORIDE 2 MG/1
2 CAPSULE ORAL 4 TIMES DAILY PRN
Status: DISCONTINUED | OUTPATIENT
Start: 2025-05-06 | End: 2025-05-09 | Stop reason: HOSPADM

## 2025-05-06 RX ORDER — VENLAFAXINE 75 MG/1
75 TABLET ORAL DAILY
Status: ON HOLD | COMMUNITY
End: 2025-05-09 | Stop reason: HOSPADM

## 2025-05-06 RX ORDER — ALBUTEROL SULFATE 90 UG/1
2 INHALANT RESPIRATORY (INHALATION) EVERY 6 HOURS PRN
Status: ON HOLD | COMMUNITY
End: 2025-05-09 | Stop reason: HOSPADM

## 2025-05-06 RX ORDER — ATORVASTATIN CALCIUM 20 MG/1
20 TABLET, FILM COATED ORAL NIGHTLY
Status: DISCONTINUED | OUTPATIENT
Start: 2025-05-06 | End: 2025-05-09 | Stop reason: HOSPADM

## 2025-05-06 RX ORDER — BUTALBITAL, ACETAMINOPHEN AND CAFFEINE 50; 325; 40 MG/1; MG/1; MG/1
1 TABLET ORAL EVERY 4 HOURS PRN
Status: DISCONTINUED | OUTPATIENT
Start: 2025-05-06 | End: 2025-05-09 | Stop reason: HOSPADM

## 2025-05-06 RX ORDER — CLONAZEPAM 0.5 MG/1
0.5 TABLET ORAL 2 TIMES DAILY PRN
Status: ON HOLD | COMMUNITY
End: 2025-05-09 | Stop reason: HOSPADM

## 2025-05-06 RX ORDER — CLONAZEPAM 0.5 MG/1
0.5 TABLET ORAL EVERY 12 HOURS PRN
Status: DISCONTINUED | OUTPATIENT
Start: 2025-05-06 | End: 2025-05-07

## 2025-05-06 RX ORDER — DULOXETIN HYDROCHLORIDE 30 MG/1
30 CAPSULE, DELAYED RELEASE ORAL DAILY
Status: DISCONTINUED | OUTPATIENT
Start: 2025-05-06 | End: 2025-05-09 | Stop reason: HOSPADM

## 2025-05-06 RX ORDER — ATORVASTATIN CALCIUM 20 MG/1
20 TABLET, FILM COATED ORAL DAILY
Status: ON HOLD | COMMUNITY
End: 2025-05-09 | Stop reason: HOSPADM

## 2025-05-06 RX ORDER — VENLAFAXINE 37.5 MG/1
37.5 TABLET ORAL DAILY
Status: DISCONTINUED | OUTPATIENT
Start: 2025-05-07 | End: 2025-05-09 | Stop reason: HOSPADM

## 2025-05-06 RX ORDER — PHENTERMINE HYDROCHLORIDE 37.5 MG/1
37.5 TABLET ORAL
COMMUNITY

## 2025-05-06 RX ORDER — TOPIRAMATE 25 MG/1
50 TABLET, FILM COATED ORAL 2 TIMES DAILY
Status: ON HOLD | COMMUNITY
End: 2025-05-09 | Stop reason: HOSPADM

## 2025-05-06 RX ADMIN — AMLODIPINE BESYLATE 5 MG: 5 TABLET ORAL at 20:54

## 2025-05-06 RX ADMIN — LOPERAMIDE HYDROCHLORIDE 2 MG: 2 CAPSULE ORAL at 15:44

## 2025-05-06 RX ADMIN — DULOXETINE HYDROCHLORIDE 30 MG: 30 CAPSULE, DELAYED RELEASE ORAL at 15:44

## 2025-05-06 RX ADMIN — TRAZODONE HYDROCHLORIDE 50 MG: 50 TABLET ORAL at 20:54

## 2025-05-06 RX ADMIN — SUMATRIPTAN SUCCINATE 50 MG: 25 TABLET ORAL at 15:44

## 2025-05-06 RX ADMIN — ATORVASTATIN CALCIUM 20 MG: 20 TABLET, FILM COATED ORAL at 20:54

## 2025-05-06 ASSESSMENT — ENCOUNTER SYMPTOMS
NAUSEA: 0
ABDOMINAL PAIN: 0
DIARRHEA: 1
SHORTNESS OF BREATH: 0
VOMITING: 0
BACK PAIN: 0
COUGH: 0

## 2025-05-06 ASSESSMENT — PAIN DESCRIPTION - LOCATION: LOCATION: HEAD

## 2025-05-06 ASSESSMENT — SLEEP AND FATIGUE QUESTIONNAIRES
SLEEP PATTERN: DIFFICULTY FALLING ASLEEP;RESTLESSNESS
DO YOU USE A SLEEP AID: YES
AVERAGE NUMBER OF SLEEP HOURS: 4
DO YOU HAVE DIFFICULTY SLEEPING: YES

## 2025-05-06 ASSESSMENT — PAIN - FUNCTIONAL ASSESSMENT: PAIN_FUNCTIONAL_ASSESSMENT: NONE - DENIES PAIN

## 2025-05-06 NOTE — GROUP NOTE
Group Therapy Note    Date: 5/6/2025    Group Start Time: 1035  Group End Time: 1110  Group Topic: Activity    SSR 2 [facility] ACUTE    Jeny Turner        Group Therapy Note    Attendees: 4/9       Patient's Goal:  Patient stated goal is to get meds and get rid of headache.    Notes:  Patient stated Anxiety 10, Depression 8. Patient participated in group activity. BINGO.    Status After Intervention:  Improved    Participation Level: Active Listener    Participation Quality: Appropriate and Attentive      Speech:  normal      Thought Process/Content: Logical      Affective Functioning: Congruent      Mood: Nervous.      Level of consciousness:  Alert and Attentive      Response to Learning: Able to verbalize current knowledge/experience      Endings: None Reported    Modes of Intervention: Education and Activity      Discipline Responsible: Behavorial Health Tech      Signature:  Jeny Turner

## 2025-05-06 NOTE — BH NOTE
PSYCHOSOCIAL ASSESSMENT  :Patient identifying info:   Mariana Benavides is a 54 y.o., female admitted 2025  3:29 PM     Presenting problem and precipitating factors: per ed triage note-Patient states a few days ago used crack cocaine in an attempt to overdose. She is endorsing SI thoughts. She is in recovery program.   Last use of drugs Friday. [facility] program. No hi, endorses hearing voices to harm herself.     Mental status assessment: alert, oriented, calm, guarded, disheveled, fair judgement/insight, soft spoken, with a flat affect. No aggression/agitation noted.    Strengths/Recreation/Coping Skills:music, go for a picnic, swim, arts and crafts, play games, watch movies    Collateral information: pt has signed a sebastian for [facility] , reached out, no ans will follow up at a later time    Current psychiatric /substance abuse providers and contact info:  participates in outpatient psychiatric care with GENARO Malik-BC, APRN.      Previous psychiatric/substance abuse providers and response to treatment: has a history of suicide attempts, to include: intentional overdoses, hanging, and cutting her wrist. She's had multiple inpatient psychiatric admissions in the past.  intentionally overdosed 3 nights     Family history of mental illness or substance abuse: Family Psychiatric History: anxiety, depression     Substance abuse history: tox positive for amphetamine and cocaine   Social History     Tobacco Use    Smoking status: Every Day     Types: Cigarettes    Smokeless tobacco: Never   Substance Use Topics    Alcohol use: Not Currently       History of biomedical complications associated with substance abuse: pt did not report any    Patient's current acceptance of treatment or motivation for change: \"get my mental stability back\"    Family constellation: , 2 living adult children and 2  children    Is significant other involved? Pt did not identify anyone    Describe

## 2025-05-06 NOTE — PLAN OF CARE
Problem: Anxiety  Goal: Will report anxiety at manageable levels  Description: INTERVENTIONS:1. Administer medication as ordered2. Teach and rehearse alternative coping skills3. Provide emotional support with 1:1 interaction with staff  Outcome: Progressing     Problem: Behavior  Goal: Pt/Family maintain appropriate behavior and adhere to behavioral management agreement, if implemented  Description: INTERVENTIONS:1. Assess patient/family's coping skills and  non-compliant behavior (including use of illegal substances)2. Notify security of behavior or suspected illegal substances which indicate the need for search of the family and/or belongings3. Encourage verbalization of thoughts and concerns in a socially appropriate manner4. Utilize positive, consistent limit setting strategies supporting safety of patient, staff and others5. Encourage participation in the decision making process about the behavioral management agreement6. If a visitor's behavior poses a threat to safety call refer to organization policy.7. Initiate consult with , Psychosocial CNS, Spiritual Care as appropriate  Outcome: Progressing     Problem: Depression/Self Harm  Goal: Effect of psychiatric condition will be minimized and patient will be protected from self harm  Description: INTERVENTIONS:1. Assess impact of patient's symptoms on level of functioning, self care needs and offer support as indicated2. Assess patient/family knowledge of depression, impact on illness and need for teaching3. Provide emotional support, presence and reassurance4. Assess for possible suicidal thoughts or ideation. If patient expresses suicidal thoughts or statements do not leave alone, initiate Suicide Precautions, move to a room close to the nursing station and obtain sitter5. Initiate consults as appropriate with Mental Health Professional, Spiritual Care, Psychosocial CNS, and consider a recommendation to the [facility] for a Psychiatric  Consultation  Outcome: Progressing

## 2025-05-06 NOTE — CARE COORDINATION
05/06/25 1431   ITP   Date of Plan 05/06/25   Date of Next Review 05/13/25   Primary Diagnosis Code Schizophrenia (HCC) F20.9   Barriers to Treatment Client resistance   Strengths Incorporated in Plan Acknowledging need for assistance   Plan of Care   Long Term Goal (LTG) Stated in patient/guardian terms \"get mental stability back\"   Short Term Goal 1   Short Term Goal 1 Client will be oriented to program and staff, and participate in assessment process   Baseline Functioning to make the indivual comfortable with the environment while in the hospital   Target the individual will be able to approach staff and voice appropriate needs   Objectives Client will participate in individual therapy;Client will participate in group therapy   Intervention  Assess safety   Frequency daily   Measured by Self report;Staff observation   Staff Responsible Clinical staff;[facility] staff   Intervention 2 Acknowledge client strengths   Frequency daily   Measured by Self report;Staff observation   Staff Responsible Clinical staff;[facility] staff   Intervention 3 Group therapy   Frequency daily   Measured by Self report;Staff observation   STG Goal 1 Status: Patient Appears to be  Progressing toward treatment plan goal   Short Term Goal 2   Short Term Goal 2 Client will learn and demonstrate effective coping skills   Baseline Functioning to improve the over all qualilty of life   Target the individual will be able to use positive skills to deal with daily life stressors   Objectives Client will participate in individual therapy;Client will participate in group therapy   Intervention  Indvidual therapy   Frequency daily   Measured by Self report;Staff observation   Staff Responsible Clinical staff;[facility] staff   Intervention 2 Milieu therapy and support   Frequency daily   Measured by Staff observation;Self report   Staff Responsible Clinical staff;[facility] staff   Intervention 3 Monitor medications   Frequency daily   Measured by Self report;Staff

## 2025-05-06 NOTE — FLOWSHEET NOTE
05/05/25 3982   C-SSRS Suicide Screening   1) Within the past month, have you wished you were dead or wished you could go to sleep and not wake up?  Yes   2) Have you actually had any thoughts of killing yourself?  Yes   3) Have you been thinking about how you might kill yourself?  Yes   4) Have you had these thoughts and had some intention of acting on them?  Yes   5) Have you started to work out or worked out the details of how to kill yourself? Do you intend to carry out this plan?  Yes   6) Have you ever done anything, started to do anything, or prepared to do anything to end your life? Yes   Risk of Suicide High Risk   C-SSRS Risk Assessment   Suicidal and Self-Injurious Behavior  Actual suicide attempt - Past 3 months   Suicidal Ideation (Most Severe in Past Month) Suicidal thoughts   Activating Events (Recent) Recent loss(es) or other significant negative event(s) (legal, financial, relationship, etc.)   Treatment History Previous psychiatric diagnosis and treatments   Clinical Status (Recent) Hopelessness   Protective Factors (Recent) Identifies reasons for living;Supportive social network or family   Other Risk Factors History of SI attempts   Other Protective Factors seeking treatment   Describe any suicidal, self injurious, or aggressive behavior (include dates) Pt currently denies SI.   CSSRS Risk Notification   Provider Notified Yes   Name of Team Member Notified Dr. Posadas     Informed Dr. Posadas C-SSRS screening score. SI precautions and constant observation HEMANT

## 2025-05-06 NOTE — BH NOTE
Patient visible on unit. Alert & Oriented. Patient is soft spoken, polite. She presents as depressed, preoccupied. She is not interactive with peers, though she does eat her meals in the dayroom. Her appearance is semi-tidy. She received her first dose of Cymbalta. She also complained of a migraine, and diarrhea, and received Imitrex, and Immodium, respectively. She reports relief of both complaints.   She reports history of taking klonopin. Her UDS was negative for benzodiazepines but her medication history indicated a prescription was filled 4/3/25 for 42 tablets for 21 days.   She also reports taking venlafaxine 37.5mg.   Dr. Posadas informed.   Q15 minute checks maintained.

## 2025-05-06 NOTE — GROUP NOTE
Group Therapy Note    Date: 5/6/2025    Group Start Time: 1300  Group End Time: 1330  Group Topic: Process Group - Inpatient    SSR 2 BEHA TH ACUTE    Joana Wilson MSW        Group Therapy Note: Facilitator encouraged the 2 group members to talk about what lead to hospitalization, how their feeling now, and discharge plans.    Attendees: 2       Patient's Goal:  To attend groups    Notes:  Pt shared that she enjoys cooking, arts & crafts, swimming and reading. She also talked about being bullied as a child and recently as an adult. Pt states she has a mental health skill builder who is looking for a trauma therapist for her.     Status After Intervention:  Improved    Participation Level: Active Listener    Participation Quality: Appropriate, Attentive, and Sharing      Speech:  normal      Thought Process/Content: Logical      Affective Functioning: Flat      Mood: \"tired\"      Level of consciousness:  Alert, Oriented x4, and Attentive      Response to Learning: Able to verbalize current knowledge/experience, Able to verbalize/acknowledge new learning, Able to retain information, Capable of insight, Able to change behavior, and Progressing to goal      Endings: None Reported    Modes of Intervention: Education, Support, and Socialization      Discipline Responsible: /Counselor      Signature:  PERLA CAST

## 2025-05-06 NOTE — BH NOTE
[facility]  Admission Note     Admission Type: Voluntary     Provider: Dr. Posadas    Diagnosis: Schizophrenia     Reason for admission: Pt reported she has a lot of trauma in her life and during this time of year is a trigger for her. Pt reported cocaine use to end her life. Pt currently is in a IOP program called [facility].    Medical Problems:   No past medical history on file.    Status EXAM:  Mental Status and Behavioral Exam  Normal: No  Level of Assistance: Independent/Self  Facial Expression: Sad, Worried  Affect: Congruent  Level of Consciousness: Alert  Frequency of Checks: 4 times per hour, close  Mood:Normal: No  Mood: Anxious, Depressed  Motor Activity:Normal: Yes  Eye Contact: Good  Observed Behavior: Cooperative, Preoccupied  Sexual Misconduct History: Current - no  Preception: [place] to person, [place] to time, [place] to place, [place] to situation  Attention:Normal: No  Attention: Distractible  Thought Processes: Circumstantial  Thought Content:Normal: No  Thought Content: Preoccupations  Depression Symptoms: Feelings of hopelessess, Feelings of helplessness, Impaired concentration  Anxiety Symptoms: Generalized  Lakia Symptoms: No problems reported or observed.  Hallucinations: Auditory (comment)  Delusions: No  Memory:Normal: Yes  Insight and Judgment: No  Insight and Judgment: Poor judgment, Poor insight    Pt admitted with followings belongings:  Dental Appliances: None  Vision - Corrective Lenses: Eyeglasses  Hearing Aid: None  Jewelry: Ring  Body Piercings Removed: N/A  Clothing: Footwear, Pants, Shirt, Undergarments, Socks  Other Valuables: Wallet, Cigarettes, Other (Comment)    Belongings inventoried by YON Martell, WONG Cao and WONG Gupta   Belongings sent to unit and security safe.        Patient oriented to surroundings and program expectations and copy of patient rights given. Yes  Received admission packet: Yes  Consents reviewed, signed. Yes  Patient verbalize

## 2025-05-06 NOTE — BH NOTE
TRANSFER - IN REPORT:    Verbal report received from WONG Evans on Mariana Benavides being received from [facility] for routine progression of patient care      Report consisted of patient's Situation, Background, Assessment and   Recommendations(SBAR).     Information from the following report(s) Nurse Handoff Report was reviewed with the receiving nurse.    Opportunity for questions and clarification was provided.      Assessment completed upon patient's arrival to unit and care assumed.

## 2025-05-06 NOTE — BSMART NOTE
Per Elke with [facility]ESS, patient accepted by Dr. Posadas, room 239a.  Writer informed WONG Evans, and she was already informed of be placement assignment.

## 2025-05-06 NOTE — CONSULTS
Hospitalist Admission Note    NAME: Mariana Benavides   :  1970   MRN:  [id number]     Date/Time:  2025 2:50 PM    Patient PCP: Ruddy Mitchell MD    ______________________________________________________________________  Given the patient's current clinical presentation in regards to the patient's multiple medical problems, complex decision making was performed, which includes reviewing the patient's available past medical records, laboratory results, and diagnostic studies.       My assessment of this patient's clinical condition and my plan of care is as follows.    Assessment / Plan:    Suicidal ideation with attempted overdose  Polysubstance abuse  Major depressive disorder  Schizophrenia   Continue management per primary psychiatry    Hypertension  Patient previously on amlodipine, blood pressures above goal  Restart amlodipine 5 mg nightly    Migraine headaches  No focal neurologic deficit  Continue Imitrex and Fioricet as needed    Hyperlipidemia  Continue atorvastatin    Tobacco dependency  Patient counseled on cessation  Offered nicotine patch however patient declined    Asthma  No exacerbation  Continue albuterol as needed    Diarrhea  Abdomen soft, nontender  No fever or leukocytosis to suspect occult infection  Imodium as needed      Patient medically stable for continued inpatient psychiatric stabilization.  Will sign off at this time. Thank you for this consult.  Please call or reconsult any questions or concerns.        Subjective:   CHIEF COMPLAINT: Suicidal ideation    REASON FOR CONSULT: Medical H&P    HISTORY OF PRESENT ILLNESS:     Mariana Benavides is a 54 y.o.  female with PMHx significant for HTN, HLD, asthma, polysubstance abuse, schizophrenia who presented to the ED for suicidal ideation.  Patient is currently in drug rehab program and attempted to overdose on crack cocaine a few days ago.  She was advised to the ED by her read program.  Patient endorsed auditory hallucinations

## 2025-05-07 PROCEDURE — 6370000000 HC RX 637 (ALT 250 FOR IP): Performed by: STUDENT IN AN ORGANIZED HEALTH CARE EDUCATION/TRAINING PROGRAM

## 2025-05-07 PROCEDURE — 1240000000 HC EMOTIONAL WELLNESS R&B

## 2025-05-07 PROCEDURE — 6370000000 HC RX 637 (ALT 250 FOR IP): Performed by: PSYCHIATRY & NEUROLOGY

## 2025-05-07 RX ORDER — CLONAZEPAM 0.5 MG/1
0.25 TABLET ORAL EVERY 12 HOURS PRN
Status: DISCONTINUED | OUTPATIENT
Start: 2025-05-07 | End: 2025-05-09 | Stop reason: HOSPADM

## 2025-05-07 RX ORDER — TRAZODONE HYDROCHLORIDE 50 MG/1
25 TABLET ORAL NIGHTLY PRN
Status: DISCONTINUED | OUTPATIENT
Start: 2025-05-07 | End: 2025-05-09 | Stop reason: HOSPADM

## 2025-05-07 RX ADMIN — TRAZODONE HYDROCHLORIDE 50 MG: 50 TABLET ORAL at 22:15

## 2025-05-07 RX ADMIN — DULOXETINE HYDROCHLORIDE 30 MG: 30 CAPSULE, DELAYED RELEASE ORAL at 09:00

## 2025-05-07 RX ADMIN — ATORVASTATIN CALCIUM 20 MG: 20 TABLET, FILM COATED ORAL at 22:14

## 2025-05-07 RX ADMIN — AMLODIPINE BESYLATE 5 MG: 5 TABLET ORAL at 22:14

## 2025-05-07 RX ADMIN — VENLAFAXINE 37.5 MG: 37.5 TABLET ORAL at 09:01

## 2025-05-07 NOTE — BH NOTE
Patient remains on close observation.  Patient has been alert, oriented, cooperative and pleasant.   Patient has mostly been laying in bed.   She has not voiced depression, anxiety, SI or HI. Medication compliant.  Continue to assess.

## 2025-05-07 NOTE — BH NOTE
Behavioral Health Treatment Team Note     Patient goal(s) for today: \"to feel better\"  Treatment team focus/goals:  continue medication management, group therapy, maintain ADLs and provide a safe discharge    Progress note: Pt presented with a depressed, flat, hopeless affect and congruent mood. Pt denied any SI/HI/Avh at the time and was orientated x4. Pt was pleasant and in need of grooming. Pt reported that she was in a program and \"needs to stay away from drugs\". Pt stated that she wants to return to the program when she is adriel on medications. An inpatient level of care is needed to further stabilize the pt on medications.     LOS:  2  Expected LOS: 5-7 days    Insurance info/prescription coverage:  [employer]  Date of last family contact:  5-6-25   Family requesting physician contact today:  No  Discharge plan:  to stabilize the pt   Guns in the home:  No   Outpatient provider(s):  [facility]    Participating treatment team members: Mariana Benavides, * (assigned SW), Eneida Salcedo LMSW

## 2025-05-07 NOTE — BH NOTE
Pt.lying down in bed at present, affect is flat, appetite good, appearance is fairly neat,denies feeling suicidal,mood is depressed, low energy level, hopeless, helpless,isolates to self,pleasant upon approach, accepts medication as ordered, denies cravings for SA,no concerns voiced,remains on close observation.

## 2025-05-07 NOTE — PLAN OF CARE
Problem: Behavior  Goal: Pt/Family maintain appropriate behavior and adhere to behavioral management agreement, if implemented  Description: INTERVENTIONS:1. Assess patient/family's coping skills and  non-compliant behavior (including use of illegal substances)2. Notify security of behavior or suspected illegal substances which indicate the need for search of the family and/or belongings3. Encourage verbalization of thoughts and concerns in a socially appropriate manner4. Utilize positive, consistent limit setting strategies supporting safety of patient, staff and others5. Encourage participation in the decision making process about the behavioral management agreement6. If a visitor's behavior poses a threat to safety call refer to organization policy.7. Initiate consult with , Psychosocial CNS, Spiritual Care as appropriate  5/6/2025 1452 by Chastity Robles RN  Outcome: Progressing

## 2025-05-07 NOTE — GROUP NOTE
Group Therapy Note    Date: 5/6/2025    Group Start Time: 1945  Group End Time: 2030  Group Topic: Recreational    SSR 2 BH NON ACUTE    Noelle Sultana        Group Therapy Note    Attendees: 5/9    Recreational Therapist facilitated structured leisure skills group to introduce healthy leisure skills as positive way to cope and manage mood         Patient's Goal:  Client will learn and demonstrate effective coping skills     Notes:  Attended group. Listened to songs and selected songs to listen to with cues/prompts. Pt was receptive to intervention and cooperative entire group session.      Status After Intervention:  Improved    Participation Level: Active Listener    Participation Quality: Appropriate      Speech:  normal      Thought Process/Content: Logical      Affective Functioning: Congruent      Mood:  calm       Level of consciousness:  Alert      Response to Learning: Progressing to goal      Endings: None Reported    Modes of Intervention: Activity      Discipline Responsible: Recreational Therapist      Signature:  KIRSTY Monterroso

## 2025-05-07 NOTE — H&P
[address]  [address]                                PSYCH H&P      PATIENT NAME: PINKY CAMPOS               : 1970  MED REC NO: [id number]                       ROOM: 239  ACCOUNT NO: [id number]                       ADMIT DATE: 2025  PROVIDER: Ray Posadas MD      HISTORY OF PRESENT ILLNESS:  This is a 54-year-old single  female patient admitted to Behavioral Health Unit from [facility] ED, depression, suicidal thought, made an attempt to kill herself with using cocaine 3 days prior to admission, depression, suicidal thought, plans to overdose with pills, past history of overdose with pills, cutting herself, hanging herself, several stressors, several people lost in her life.  Poor eating, poor sleep.  She was seeing somebody  taking Topamax, clonazepam 0.5 mg, Effexor 75 mg.  She is also feeling paranoid.  She is , lives alone.    TRAUMA HISTORY:  Positive.    SUBSTANCE ABUSE:  Positive for amphetamine and cocaine.    MEDICAL HISTORY:  Episodic migraine attacks.    LABS:  Urine drug screen positive for amphetamine, cocaine, COVID-19, influenza A and B not detected.  CBC unremarkable.  CMP:  Chloride 113, BUN and creatinine ratio of 7.  Liver functions; total protein 5.7, albumin 2.7, globulin 3, A/G ratio 0.9.  Ethanol level 10.  Urinalysis with reflux, glucose 150.  Epithelial cells many, otherwise unremarkable.    VITAL SIGNS:  Pulse 68, blood pressure 136/80, temperature 98.1, height 5 feet 2 inches, weight 102.1 kg, respirations 16, O2 saturation 94%.    ALLERGIES:  TO MEDICATIONS  AMBIEN, ZOLOFT, BACTRIM.      MENTAL STATUS:  Average height and a little bit heavyset female patient in bed, alert, verbal, polite, cooperative, articulate, oriented in all the 3 spheres and context.  Acknowledged depression, having made a suicidal attempt, took more cocaine to kill herself.  Prior suicidal attempt, prior

## 2025-05-08 PROCEDURE — 6370000000 HC RX 637 (ALT 250 FOR IP): Performed by: STUDENT IN AN ORGANIZED HEALTH CARE EDUCATION/TRAINING PROGRAM

## 2025-05-08 PROCEDURE — 1240000000 HC EMOTIONAL WELLNESS R&B

## 2025-05-08 PROCEDURE — 2500000003 HC RX 250 WO HCPCS: Performed by: PSYCHIATRY & NEUROLOGY

## 2025-05-08 PROCEDURE — 6370000000 HC RX 637 (ALT 250 FOR IP): Performed by: PSYCHIATRY & NEUROLOGY

## 2025-05-08 RX ADMIN — SUMATRIPTAN SUCCINATE 50 MG: 25 TABLET ORAL at 20:40

## 2025-05-08 RX ADMIN — VENLAFAXINE 37.5 MG: 37.5 TABLET ORAL at 08:57

## 2025-05-08 RX ADMIN — AMLODIPINE BESYLATE 5 MG: 5 TABLET ORAL at 20:40

## 2025-05-08 RX ADMIN — ATORVASTATIN CALCIUM 20 MG: 20 TABLET, FILM COATED ORAL at 20:41

## 2025-05-08 RX ADMIN — MAGNESIUM HYDROXIDE 30 ML: 400 SUSPENSION ORAL at 20:40

## 2025-05-08 RX ADMIN — TRAZODONE HYDROCHLORIDE 25 MG: 50 TABLET ORAL at 20:40

## 2025-05-08 RX ADMIN — MICONAZOLE NITRATE: 20 POWDER TOPICAL at 13:57

## 2025-05-08 RX ADMIN — DULOXETINE HYDROCHLORIDE 30 MG: 30 CAPSULE, DELAYED RELEASE ORAL at 08:57

## 2025-05-08 ASSESSMENT — PAIN SCALES - GENERAL: PAINLEVEL_OUTOF10: 4

## 2025-05-08 ASSESSMENT — PAIN DESCRIPTION - LOCATION: LOCATION: HEAD

## 2025-05-08 ASSESSMENT — PAIN DESCRIPTION - ORIENTATION: ORIENTATION: RIGHT;LEFT

## 2025-05-08 ASSESSMENT — PAIN DESCRIPTION - DESCRIPTORS: DESCRIPTORS: ACHING

## 2025-05-08 NOTE — BH NOTE
Behavioral Health Treatment Team Note     Patient goal(s) for today: \"get up a bit, not sleep as much, shower\"  Treatment team focus/goals: meds management, dc planning, group therapy    Progress note: Pt was seen in her room as she was resting. Pt woke up easily and presented to be open to this writer's approach, alert, oriented, engaging in conversation with appropriate eye contact, somewhat disheveled, calm, cooperative, fair judgment/insight with a sad affect. Pt denied current si/hi/ah/vh. Pt reported her depression and anxiety to be a 9 on a scale of 1-10 but did not voice a trigger. Pt voice guilt and disspointment in self for using but informed this writer that this is an emotionally challenging time of the year for her. Pt did report increase in sleep but stated today she will try to stay up and out of bed. Attending provider will be made aware of this. Pt did not report any changes in appetite. Pt did not voice any further concerns. An inpt level of care is needed to due to uncontrolled substance abuse and high risk of rehospitalization.     LOS:  3  Expected LOS: 5-7 days    Insurance info/prescription coverage:   [employer]   Date of last family contact:  pt has signed a sebastian for felician[facility]/ [phone], who stated that pt can return back to the facility and staff will be able to pick her up vs medicaid cab. No further concerns voiced.   Family requesting physician contact today:  No  Discharge plan:  to stabilize the pt   Guns in the home:  No   Outpatient provider(s):   [facility]len[facility] Negron, SHADYP-BC, APRN.       Participating treatment team members: Mariana Benavides, * (assigned SW), Nichole Arteaga, MS

## 2025-05-08 NOTE — GROUP NOTE
Group Therapy Note    Date: 5/8/2025    Group Start Time: 1550  Group End Time: 1635  Group Topic: Recreational    SSR 2  NON ACUTE    Kelly Avina        Group Therapy Note    Facilitated leisure skills group to reinforce positive coping and to manage mood through music, social interaction, group activities and art task       Attendees: 6/10       Patient's Goal:  Client will learn and demonstrate effective coping skills    Notes:  Pt was receptive to listening to music and songs she selected. Interacted when prompted. Declined to select leisure task to work on      Status After Intervention:  Improved    Participation Level: Active Listener and Interactive    Participation Quality: Appropriate      Speech:  normal      Thought Process/Content: Logical      Affective Functioning: Congruent      Mood:  calm      Level of consciousness:  Alert      Response to Learning: Progressing to goal      Endings: None Reported    Modes of Intervention: Socialization and Activity      Discipline Responsible: Recreational Therapist      Signature:  KIRSTY Collins

## 2025-05-08 NOTE — BH NOTE
DISCHARGE SUMMARY    NAME:Mariana Benavides  : 1970  MRN: [id number]    The patient Mariana Benavides exhibits the ability to control behavior in a less restrictive environment.  Patient's level of functioning is improving.  No assaultive/destructive behavior has been observed for the past 24 hours.  No suicidal/homicidal threat or behavior has been observed for the past 24 hours.  There is no evidence of serious medication side effects.  Patient has not been in physical or protective restraints for at least the past 24 hours.    If weapons involved, how are they secured? N/a    Is patient aware of and in agreement with discharge plan? yes    Arrangements for medication:  Prescriptions see chart    Copy of discharge instructions to provider?:  yes    Arrangements for transportation home:  staff to     Keep all follow up appointments as scheduled, continue to take prescribed medications per physician instructions.  Mental health crisis number:  [phone]    Mental Health Emergency WARM LINE      [phone] ([phone])      M-F: 9am to 9pm      Sat & Sun: 5pm - 9pm  National suicide prevention lines:                             1-974-AKXLGXP (9-855-863-5571)       1-973-921-TALK ([phone])    Crisis Text Line:  Text HOME to 629624

## 2025-05-08 NOTE — BH NOTE
Per Dane ([phone]) with [employer] insurance, an incident report will be submitted in response to the patient reportedly overdosing/using cocaine at [facility].

## 2025-05-08 NOTE — BH NOTE
No avail New Pt appt sooner than 9/20 with general cardiologist     If pt is EP pt there are new pt appts avail sooner than 9/20 please clarify if EP is ok   Progress note for May 7, 2025 patient case discussed in the treatment team patient seen for follow-up chart reviewed patient isolate herself in the room in the bed drowsy woke up when called and says tired low energy level did sleep good did get up to go to meals still anti-D discharge well but energy depressed but not suicidal nor homicidal no auditory or visual hallucination when she finishes the treatment here she want to go back to the [facility] not suicidal not homicidal medication reviewed reduce the trazodone from 50 to 25 mg reduce the clonazepam 0.5 mg to 0.25 mg twice a day as needed vital signs pulse 73 blood pressure 132/85 temperature 98.1 respiration 19 O2 saturation 91% continued inpatient level of care indicated for further.  Treatment stabilization appropriate safe discharge planning

## 2025-05-08 NOTE — GROUP NOTE
Group Therapy Note    Date: 5/8/2025    Group Start Time: 1050  Group End Time: 1130  Group Topic: Education Group - Inpatient    SSR 2  NON ACUTE    Kelly Avina        Group Therapy Note    Facilitated group to discuss definition and examples of healthy vs unhealthy coping strategies. Introduced examples of unhealthy scenarios to help identify consequences from using unhealthy strategies and recognizing healthy coping strategies that would be helpful and identifying barriers that may prevent using healthy coping strategies       Attendees: 2/11      Notes:  Encouraged but did not attend    Discipline Responsible: Recreational Therapist      Signature:  KIRSTY Collins

## 2025-05-08 NOTE — BH NOTE
DAYSHIFT NOTE:     Patient up for meals and sits in the dayroom to eat. Patient went back to her room to lay down this morning and stated that she was \"tired.\" Patient stated that she has been sleeping more than usual even prior to admission. Patient states that she is feeling depressed and anxious. Denies SI/HI. Denies AH/VH. Patient presents as guarded and does not elaborate in much conversations. Patient has not attended all groups today. Patient mostly stays isolative. Close observations continued to ensure patient safety.

## 2025-05-08 NOTE — GROUP NOTE
Group Therapy Note    Date: 5/7/2025    Group Start Time: 1935  Group End Time: 2020  Group Topic: Recreational    SSR 2 BH NON ACUTE    Noelle Sultana        Group Therapy Note    Attendees: 4/11      Recreational Therapist facilitated structured leisure skills group to introduce healthy leisure skills as positive way to cope and manage mood.        Patient's Goal:   Client will learn and demonstrate effective coping skills     Notes:  Pt joined group late. Sat in session for 10 min. Listened to songs selected by peers. Was receptive to intervention.    Status After Intervention:  Unchanged    Participation Level: Minimal    Participation Quality: Appropriate      Speech:  normal      Thought Process/Content: Logical      Affective Functioning: Congruent      Mood:  calm       Level of consciousness:  Alert      Response to Learning: Progressing to goal      Endings: None Reported    Modes of Intervention: Activity      Discipline Responsible: Recreational Therapist      Signature:  KIRSTY Monterroso

## 2025-05-08 NOTE — WOUND CARE
Wound Care Note:    Wound Care into see patient because of redness and open areas to abdominal fold and groin    Skin Care & Pressure Relief Recommendations, if needed:  Minimize layers of linen  Pads under patient to optimize support surface and microclimate  Turn/reposition approximately every 2 hours.  Pillow Wedges  Manage incontinence  Promote continence; Skin Protective lotion to buttocks and sacrum daily and as needed with incontinence care    Offload heels with pillows or offloading boots.    Patient evaluated in .  Carrier Rn present for assessment of skin folds to abdominal fold and both groin areas.  Skin folds are slightly pink with mild yeast.    -Apply Micotin 2% topical powder to abdominal and groin skin folds BID and prn for soiling.  May apply ABD pads in skin folds of abdomen to keep skin folds .      No other skin/wound care issues noted.  Patient is independent in self-care.  Re-consult [facility] for other skin/wound care concerns.       Discussed above plan with patient & Is[facility].

## 2025-05-08 NOTE — BH NOTE
Patient has been isolative at time in her room. Patient is depressed and admits to hearing voices.Patient is compliant with medications. Cooperative and pleasant .Remains on q15 minutes safety checks.

## 2025-05-08 NOTE — BH NOTE
Behavioral Health Transition Record to Provider    Patient Name: Mariana Benavides  YOB: 1970  Medical Record Number: [id number]  Date of Admission: 5/5/2025  Date of Discharge: 5/09/2025    Attending Provider: Ray Posadas MD  Discharging Provider: Dr Posadas  To contact this individual call  and ask the  to page.  If unavailable, ask to be transferred to Behavioral Health Provider on call.  A Behavioral Health Provider will be available on call 24/7 and during holidays.    Primary Care Provider: Ruddy Mitchell MD    Allergies   Allergen Reactions    Ambien [Zolpidem Tartrate]     Zoloft [Sertraline Hcl] Hallucinations    Bactrim [Sulfamethoxazole-Trimethoprim] Rash       Reason for Admission:  per ed triage note-Patient states a few days ago used crack cocaine in an attempt to overdose. She is endorsing SI thoughts. She is in recovery program.     Admission Diagnosis: Suicidal ideation [R45.851]  Schizophrenia (HCC) [F20.9]    * No surgery found *    Results for orders placed or performed during the hospital encounter of 05/05/25   COVID-19 & Influenza Combo    Specimen: Nasopharyngeal   Result Value Ref Range    SARS-CoV-2, PCR Not Detected Not Detected      Rapid Influenza A By PCR Not Detected Not Detected      Rapid Influenza B By PCR Not Detected Not Detected     CBC with Auto Differential   Result Value Ref Range    WBC 6.1 3.6 - 11.0 K/uL    RBC 4.84 3.80 - 5.20 M/uL    Hemoglobin 13.9 11.5 - 16.0 g/dL    Hematocrit 43.2 35.0 - 47.0 %    MCV 89.3 80.0 - 99.0 FL    MCH 28.7 26.0 - 34.0 PG    MCHC 32.2 30.0 - 36.5 g/dL    RDW 14.3 11.5 - 14.5 %    Platelets 277 150 - 400 K/uL    MPV 9.9 8.9 - 12.9 FL    Nucleated RBCs 0.0 0.0  WBC    nRBC 0.00 0.00 - 0.01 K/uL    Neutrophils % 66.7 32.0 - 75.0 %    Lymphocytes % 23.7 12.0 - 49.0 %    Monocytes % 5.8 5.0 - 13.0 %    Eosinophils % 2.5 0.0 - 7.0 %    Basophils % 0.8 0.0 - 1.0 %    Immature Granulocytes % 0.5 0 - 0.5 %

## 2025-05-09 VITALS
OXYGEN SATURATION: 95 % | BODY MASS INDEX: 41.41 KG/M2 | HEIGHT: 62 IN | DIASTOLIC BLOOD PRESSURE: 82 MMHG | SYSTOLIC BLOOD PRESSURE: 119 MMHG | HEART RATE: 74 BPM | TEMPERATURE: 97.9 F | WEIGHT: 225 LBS | RESPIRATION RATE: 18 BRPM

## 2025-05-09 PROCEDURE — 6370000000 HC RX 637 (ALT 250 FOR IP): Performed by: PSYCHIATRY & NEUROLOGY

## 2025-05-09 RX ORDER — SUMATRIPTAN 50 MG/1
50 TABLET, FILM COATED ORAL DAILY PRN
Qty: 30 TABLET | Refills: 1 | Status: SHIPPED | OUTPATIENT
Start: 2025-05-09

## 2025-05-09 RX ORDER — VENLAFAXINE 37.5 MG/1
37.5 TABLET ORAL DAILY
Qty: 30 TABLET | Refills: 1 | Status: SHIPPED | OUTPATIENT
Start: 2025-05-09

## 2025-05-09 RX ORDER — AMLODIPINE BESYLATE 5 MG/1
5 TABLET ORAL NIGHTLY
Qty: 30 TABLET | Refills: 1 | Status: SHIPPED | OUTPATIENT
Start: 2025-05-09

## 2025-05-09 RX ORDER — CLONAZEPAM 0.5 MG/1
0.25 TABLET ORAL EVERY 12 HOURS PRN
Qty: 60 TABLET | Refills: 0 | Status: SHIPPED | OUTPATIENT
Start: 2025-05-09 | End: 2025-06-08

## 2025-05-09 RX ORDER — ATORVASTATIN CALCIUM 20 MG/1
20 TABLET, FILM COATED ORAL NIGHTLY
Qty: 30 TABLET | Refills: 1 | Status: SHIPPED | OUTPATIENT
Start: 2025-05-09

## 2025-05-09 RX ORDER — TRAZODONE HYDROCHLORIDE 50 MG/1
25 TABLET ORAL NIGHTLY PRN
Qty: 15 TABLET | Refills: 1 | Status: SHIPPED | OUTPATIENT
Start: 2025-05-09

## 2025-05-09 RX ORDER — DULOXETIN HYDROCHLORIDE 30 MG/1
30 CAPSULE, DELAYED RELEASE ORAL DAILY
Qty: 30 CAPSULE | Refills: 1 | Status: SHIPPED | OUTPATIENT
Start: 2025-05-09

## 2025-05-09 RX ADMIN — MICONAZOLE NITRATE: 20 POWDER TOPICAL at 08:22

## 2025-05-09 RX ADMIN — DULOXETINE HYDROCHLORIDE 30 MG: 30 CAPSULE, DELAYED RELEASE ORAL at 08:22

## 2025-05-09 RX ADMIN — VENLAFAXINE 37.5 MG: 37.5 TABLET ORAL at 08:23

## 2025-05-09 NOTE — BH NOTE
DAYSHIFT NOTE:     Patient up this morning for breakfast and goes back to her room to lay down. Patient is pleasant on approach and states, \"I am tired today.\" Patient continues to have depression and anxiety but denies SI/HI. Denies AH/VH. Patient is medication compliant. Patient discussed going back to [facility] and discussed how she doesn't feel ready to go back yet but she did verbalize how nice of a program it is and she reccommends it to anyone and she has been there since September. 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 Patient stated nothing but good things about the program but stated that she had a bad roommate right now. Patient was given reassurance that she needs to keep moving forward and take advantage of the good program and take things one day at a time. Patient agreed.     1144am- DISCHARGE NOTE:     Patient discharging to [facility] today and was picked up by staff. Discharge instructions reviewed and understood with the patient. Prescriptions given to the patient and reviewed and understood. Valuables returned to the patient from the unit safe, security safe and unit storage. Personal medications that patient brought in were returned to the patient. Patient took her lunch with her. Patient discharging back to [facility] without any complaints verbalized.

## 2025-05-09 NOTE — BH NOTE
Progress note for May 8, 2024 patient seen for follow-up case discussed in the treatment team patient still has a low energy level isolating herself did sleep good woke up in the morning had a breakfast he went back to sleep and low energy level wanted to take a shower go to some of the groups denies suicidal thought homicidal thoughts depressed apparently cannot return to the [facility] denied hallucination to me but when told one of the staff that she is hearing voices placed her on a low dose of antipsychotic anticipated discharge tomorrow thank you no side effects vital signs pulse 68 blood pressure 110/69 temperature 98.8 respiration 19 O2 saturation 93%

## 2025-05-09 NOTE — BH NOTE
Pt remained in her room for the entire evening.  Met with pt at bedside.  She was pleasant, sad, flat with fair eye contact.  She spoke to this writer a little bit about the residential program she is currently in.  She had only positive things to say about it and reports she is nearing the end of the program.  When asked if she was nervous she replied \"a little but I know everything will be O.K.\"  She talked about a job at the [employer] she was interested in applying for.  She reports that she slept on and off all day and request some milk of magnesia before bed.  Pt rates her anxiety and depression both 5/10 and denies any SI/HI or A/V/H.  Pt accepted her pm medications and appears to be sleeping well at this time.  Pt was encouraged to notify staff if she was unable to sleep.  Close observations continue for pt safety.

## 2025-05-09 NOTE — GROUP NOTE
Group Therapy Note    Date: 5/9/2025    Group Start Time: 1050  Group End Time: 1130  Group Topic: Education Group - Inpatient    SSR 2  NON ACUTE    Kelly Avina        Group Therapy Note    Setting Goals/Facilitated discussion focused on “stepping up to a better you” by taking steps to improve in their well-being and identifying goals that would help to ensure follow-up       Attendees: 7/11       Patient's Goal:  Client will learn and demonstrate effective coping skills    Notes:   Receptive to information discussed and engaged. Pt shared prior to admission on a scale of 0/poor-10/good she was taking care of herself at a \"3\"  and shared \"wanting a better life for herself and her self-esteem\"  influences how well she takes care of herself. Pt shared her goal is to  \"exercise regularly, keep up with her appointments, eat a balanced diet and socialize\"       Status After Intervention:  Improved    Participation Level: Active Listener and Interactive    Participation Quality: Appropriate, Attentive, and Sharing      Speech:  normal      Thought Process/Content: Logical      Affective Functioning: Congruent      Mood:  calm      Level of consciousness:  Attentive      Response to Learning: Able to verbalize current knowledge/experience and Progressing to goal      Endings: None Reported    Modes of Intervention: Education and Support      Discipline Responsible: Recreational Therapist      Signature:  KIRSTY Collins

## 2025-05-12 NOTE — DISCHARGE SUMMARY
[address]  [address]                            DISCHARGE SUMMARY      PATIENT NAME: PINKY CAMPOS               : 1970  MED REC NO: [id number]                       ROOM: 239  ACCOUNT NO: [id number]                       ADMIT DATE: 2025  PROVIDER: Ray Posadas MD    DISCHARGE DATE:  2025    ATTENDING PHYSICIAN:  RAY POSADAS    Please make reference to my initial psychiatric H and P.    HISTORY OF PRESENT ILLNESS:  This is a 54-year-old single  female patient admitted to Behavioral Health Unit from [facility] ED for depression, suicidal thoughts, made an attempt to kill herself using cocaine 3 days prior to admission, depression, suicidal thought.  Again, plans to overdose with pills.  Past medical history of overdose with pills, cutting herself, hanging herself, several stressors, several people lost in her life, poor eating, poor sleep.  She was seeing somebody.  Taking Topamax, clonazepam 0.5 mg, Effexor 75 mg, also feeling paranoid.  She is . Apparently, she was living in a rehab place called [facility].  She was still doing drugs while she was there and drug screen was positive for amphetamine and cocaine.    TRAUMA HISTORY:  Positive.    MEDICAL HISTORY:  Episodic migraine attacks.    LABORATORY DATA:  Drug screen positive for amphetamine, cocaine.  COVID-19, influenza A and B not detected.  CBC unremarkable.  CMP; chloride 113, BUN and creatinine ratio is 7.  Liver function:  Total protein 5.7, albumin 2.7, globulin 3, A/G ratio 0.9.  Ethanol level 10.  Urinalysis with reflux, glucose 150, epithelial cells many, otherwise unremarkable.    COURSE AND TREATMENT DURING THE HOSPITALIZATION:  The patient was treated for depression, paranoia.  Start with clonazepam 0.5 daily b.i.d., Effexor 37.5 mg daily, Topamax 25 mg twice a day, Cymbalta 30 mg daily.    The patient is pretty much run

## 2025-08-11 ENCOUNTER — TRANSCRIBE ORDERS (OUTPATIENT)
Facility: HOSPITAL | Age: 55
End: 2025-08-11

## 2025-08-11 DIAGNOSIS — R94.5 NONSPECIFIC ABNORMAL RESULTS OF LIVER FUNCTION STUDY: ICD-10-CM

## 2025-08-11 DIAGNOSIS — R74.8 ELEVATED LIVER ENZYMES: Primary | ICD-10-CM

## 2025-08-26 ENCOUNTER — HOSPITAL ENCOUNTER (OUTPATIENT)
Facility: HOSPITAL | Age: 55
Discharge: HOME | End: 2025-08-29
Payer: MEDICAID

## 2025-08-26 DIAGNOSIS — R74.8 ELEVATED LIVER ENZYMES: ICD-10-CM

## 2025-08-26 DIAGNOSIS — R94.5 NONSPECIFIC ABNORMAL RESULTS OF LIVER FUNCTION STUDY: ICD-10-CM

## 2025-08-26 PROCEDURE — 76700 US EXAM ABDOM COMPLETE: CPT

## 2025-09-05 ENCOUNTER — HOSPITAL ENCOUNTER (EMERGENCY)
Facility: HOSPITAL | Age: 55
Discharge: HOME | End: 2025-09-05
Payer: MEDICAID

## 2025-09-05 VITALS
OXYGEN SATURATION: 98 % | DIASTOLIC BLOOD PRESSURE: 91 MMHG | TEMPERATURE: 97.9 F | WEIGHT: 211.2 LBS | HEART RATE: 55 BPM | BODY MASS INDEX: 38.87 KG/M2 | SYSTOLIC BLOOD PRESSURE: 140 MMHG | RESPIRATION RATE: 16 BRPM | HEIGHT: 62 IN

## 2025-09-05 DIAGNOSIS — K52.9 GASTROENTERITIS: Primary | ICD-10-CM

## 2025-09-05 DIAGNOSIS — N30.00 ACUTE CYSTITIS WITHOUT HEMATURIA: ICD-10-CM

## 2025-09-05 LAB
ALBUMIN SERPL-MCNC: 3.6 G/DL (ref 3.5–5.2)
ALBUMIN/GLOB SERPL: 1.1 (ref 1.1–2.2)
ALP SERPL-CCNC: 77 U/L (ref 35–104)
ALT SERPL-CCNC: 29 U/L (ref 10–35)
ANION GAP SERPL CALC-SCNC: 10 MMOL/L (ref 2–14)
APPEARANCE UR: ABNORMAL
AST SERPL-CCNC: 29 U/L (ref 10–35)
BACTERIA URNS QL MICRO: ABNORMAL /HPF
BASOPHILS # BLD: 0.04 K/UL (ref 0–0.1)
BASOPHILS NFR BLD: 0.8 % (ref 0–1)
BILIRUB SERPL-MCNC: 0.3 MG/DL (ref 0–1.2)
BILIRUB UR QL: NEGATIVE
BUN SERPL-MCNC: 9 MG/DL (ref 6–20)
BUN/CREAT SERPL: 9 (ref 12–20)
CALCIUM SERPL-MCNC: 9.6 MG/DL (ref 8.6–10)
CHLORIDE SERPL-SCNC: 107 MMOL/L (ref 98–107)
CO2 SERPL-SCNC: 28 MMOL/L (ref 20–29)
COLOR UR: ABNORMAL
CREAT SERPL-MCNC: 0.97 MG/DL (ref 0.6–1)
DIFFERENTIAL METHOD BLD: ABNORMAL
EOSINOPHIL # BLD: 0.17 K/UL (ref 0–0.4)
EOSINOPHIL NFR BLD: 3.3 % (ref 0–7)
EPITH CASTS URNS QL MICRO: ABNORMAL /LPF
ERYTHROCYTE [DISTWIDTH] IN BLOOD BY AUTOMATED COUNT: 13.4 % (ref 11.5–14.5)
GLOBULIN SER CALC-MCNC: 3.2 G/DL (ref 2–4)
GLUCOSE SERPL-MCNC: 79 MG/DL (ref 65–100)
GLUCOSE UR STRIP.AUTO-MCNC: NEGATIVE MG/DL
HCT VFR BLD AUTO: 47.4 % (ref 35–47)
HGB BLD-MCNC: 15 G/DL (ref 11.5–16)
HGB UR QL STRIP: NEGATIVE
HYALINE CASTS URNS QL MICRO: ABNORMAL /LPF (ref 0–2)
IMM GRANULOCYTES # BLD AUTO: 0.01 K/UL (ref 0–0.04)
IMM GRANULOCYTES NFR BLD AUTO: 0.2 % (ref 0–0.5)
KETONES UR QL STRIP.AUTO: ABNORMAL MG/DL
LEUKOCYTE ESTERASE UR QL STRIP.AUTO: ABNORMAL
LIPASE SERPL-CCNC: 35 U/L (ref 13–60)
LYMPHOCYTES # BLD: 1.13 K/UL (ref 0.8–3.5)
LYMPHOCYTES NFR BLD: 21.7 % (ref 12–49)
MCH RBC QN AUTO: 28.1 PG (ref 26–34)
MCHC RBC AUTO-ENTMCNC: 31.6 G/DL (ref 30–36.5)
MCV RBC AUTO: 88.9 FL (ref 80–99)
MONOCYTES # BLD: 0.41 K/UL (ref 0–1)
MONOCYTES NFR BLD: 7.9 % (ref 5–13)
NEUTS SEG # BLD: 3.44 K/UL (ref 1.8–8)
NEUTS SEG NFR BLD: 66.1 % (ref 32–75)
NITRITE UR QL STRIP.AUTO: NEGATIVE
NRBC # BLD: 0 K/UL (ref 0–0.01)
NRBC BLD-RTO: 0 PER 100 WBC
PH UR STRIP: 5.5 (ref 5–8)
PLATELET # BLD AUTO: 226 K/UL (ref 150–400)
PMV BLD AUTO: 10.1 FL (ref 8.9–12.9)
POTASSIUM SERPL-SCNC: 4.6 MMOL/L (ref 3.5–5.1)
PROT SERPL-MCNC: 6.9 G/DL (ref 6.4–8.3)
PROT UR STRIP-MCNC: NEGATIVE MG/DL
RBC # BLD AUTO: 5.33 M/UL (ref 3.8–5.2)
RBC #/AREA URNS HPF: ABNORMAL /HPF (ref 0–5)
SODIUM SERPL-SCNC: 145 MMOL/L (ref 136–145)
SP GR UR REFRACTOMETRY: 1.02
URINE CULTURE IF INDICATED: ABNORMAL
UROBILINOGEN UR QL STRIP.AUTO: 1 EU/DL (ref 0.2–1)
WBC # BLD AUTO: 5.2 K/UL (ref 3.6–11)
WBC URNS QL MICRO: ABNORMAL /HPF (ref 0–4)

## 2025-09-05 PROCEDURE — 80053 COMPREHEN METABOLIC PANEL: CPT

## 2025-09-05 PROCEDURE — 81001 URINALYSIS AUTO W/SCOPE: CPT

## 2025-09-05 PROCEDURE — 83690 ASSAY OF LIPASE: CPT

## 2025-09-05 PROCEDURE — 85025 COMPLETE CBC W/AUTO DIFF WBC: CPT

## 2025-09-05 PROCEDURE — 36415 COLL VENOUS BLD VENIPUNCTURE: CPT

## 2025-09-05 PROCEDURE — 2500000003 HC RX 250 WO HCPCS

## 2025-09-05 PROCEDURE — 2580000003 HC RX 258

## 2025-09-05 PROCEDURE — 87086 URINE CULTURE/COLONY COUNT: CPT

## 2025-09-05 PROCEDURE — 6360000002 HC RX W HCPCS

## 2025-09-05 RX ORDER — CEFUROXIME AXETIL 500 MG/1
500 TABLET ORAL 2 TIMES DAILY
Qty: 12 TABLET | Refills: 0 | Status: SHIPPED | OUTPATIENT
Start: 2025-09-06 | End: 2025-09-12

## 2025-09-05 RX ORDER — ONDANSETRON 2 MG/ML
4 INJECTION INTRAMUSCULAR; INTRAVENOUS ONCE
Status: COMPLETED | OUTPATIENT
Start: 2025-09-05 | End: 2025-09-05

## 2025-09-05 RX ORDER — ONDANSETRON 4 MG/1
4 TABLET, ORALLY DISINTEGRATING ORAL 3 TIMES DAILY PRN
Qty: 21 TABLET | Refills: 0 | Status: SHIPPED | OUTPATIENT
Start: 2025-09-05

## 2025-09-05 RX ORDER — 0.9 % SODIUM CHLORIDE 0.9 %
1000 INTRAVENOUS SOLUTION INTRAVENOUS ONCE
Status: COMPLETED | OUTPATIENT
Start: 2025-09-05 | End: 2025-09-05

## 2025-09-05 RX ADMIN — SODIUM CHLORIDE 1000 ML: 0.9 INJECTION, SOLUTION INTRAVENOUS at 13:14

## 2025-09-05 RX ADMIN — ONDANSETRON 4 MG: 2 INJECTION, SOLUTION INTRAMUSCULAR; INTRAVENOUS at 13:15

## 2025-09-05 RX ADMIN — WATER 1000 MG: 1 INJECTION INTRAMUSCULAR; INTRAVENOUS; SUBCUTANEOUS at 13:17

## 2025-09-05 RX ADMIN — FAMOTIDINE 20 MG: 10 INJECTION, SOLUTION INTRAVENOUS at 13:16

## 2025-09-05 ASSESSMENT — PAIN DESCRIPTION - ORIENTATION: ORIENTATION: RIGHT;LEFT;LOWER

## 2025-09-05 ASSESSMENT — PAIN - FUNCTIONAL ASSESSMENT: PAIN_FUNCTIONAL_ASSESSMENT: PREVENTS OR INTERFERES SOME ACTIVE ACTIVITIES AND ADLS

## 2025-09-05 ASSESSMENT — PAIN DESCRIPTION - PAIN TYPE: TYPE: ACUTE PAIN

## 2025-09-05 ASSESSMENT — PAIN DESCRIPTION - DESCRIPTORS: DESCRIPTORS: ACHING

## 2025-09-05 ASSESSMENT — PAIN DESCRIPTION - LOCATION: LOCATION: ABDOMEN

## 2025-09-05 ASSESSMENT — PAIN SCALES - GENERAL: PAINLEVEL_OUTOF10: 8

## 2025-09-06 LAB
BACTERIA SPEC CULT: NORMAL
SERVICE CMNT-IMP: NORMAL